# Patient Record
Sex: MALE | Race: WHITE | Employment: OTHER | URBAN - METROPOLITAN AREA
[De-identification: names, ages, dates, MRNs, and addresses within clinical notes are randomized per-mention and may not be internally consistent; named-entity substitution may affect disease eponyms.]

---

## 2017-10-26 PROBLEM — J18.9 PNEUMONIA: Status: ACTIVE | Noted: 2017-10-26

## 2017-10-26 PROBLEM — E78.5 HLD (HYPERLIPIDEMIA): Status: ACTIVE | Noted: 2017-10-26

## 2017-10-26 PROBLEM — I25.10 CAD (CORONARY ARTERY DISEASE): Status: ACTIVE | Noted: 2017-10-26

## 2017-10-26 PROBLEM — I48.91 ATRIAL FIBRILLATION (HCC): Status: ACTIVE | Noted: 2017-10-26

## 2017-10-26 PROBLEM — Z72.0 TOBACCO ABUSE: Status: ACTIVE | Noted: 2017-10-26

## 2017-10-26 PROBLEM — Z95.1 HX OF CABG: Status: ACTIVE | Noted: 2017-10-26

## 2018-01-02 PROBLEM — R09.02 HYPOXIA: Status: ACTIVE | Noted: 2018-01-02

## 2018-01-04 PROBLEM — J44.9 STAGE 4 VERY SEVERE COPD BY GOLD CLASSIFICATION (HCC): Chronic | Status: ACTIVE | Noted: 2018-01-04

## 2018-01-15 PROBLEM — R91.8 LUNG MASS: Status: ACTIVE | Noted: 2018-01-15

## 2018-01-15 PROBLEM — J44.1 COPD EXACERBATION (HCC): Status: ACTIVE | Noted: 2018-01-15

## 2018-01-15 PROBLEM — J18.9 PNA (PNEUMONIA): Status: ACTIVE | Noted: 2018-01-15

## 2018-01-16 PROBLEM — D64.9 ANEMIA: Status: ACTIVE | Noted: 2018-01-16

## 2018-01-16 PROBLEM — R65.10 SIRS (SYSTEMIC INFLAMMATORY RESPONSE SYNDROME) (HCC): Status: ACTIVE | Noted: 2018-01-16

## 2018-01-16 PROBLEM — E66.9 OBESITY (BMI 30.0-34.9): Status: ACTIVE | Noted: 2018-01-16

## 2018-01-16 PROBLEM — J96.11 CHRONIC RESPIRATORY FAILURE WITH HYPOXIA (HCC): Status: ACTIVE | Noted: 2018-01-16

## 2018-01-18 PROBLEM — R65.10 SIRS (SYSTEMIC INFLAMMATORY RESPONSE SYNDROME) (HCC): Status: RESOLVED | Noted: 2018-01-16 | Resolved: 2018-01-18

## 2018-01-18 PROBLEM — R91.8 LUNG MASS: Status: RESOLVED | Noted: 2018-01-15 | Resolved: 2018-01-18

## 2018-01-18 PROBLEM — D64.9 ANEMIA: Status: RESOLVED | Noted: 2018-01-16 | Resolved: 2018-01-18

## 2018-01-29 PROBLEM — E87.20 LACTIC ACIDOSIS: Status: ACTIVE | Noted: 2018-01-29

## 2018-01-29 PROBLEM — D72.829 LEUKOCYTOSIS: Status: ACTIVE | Noted: 2018-01-29

## 2018-01-29 PROBLEM — I50.9 CONGESTIVE HEART FAILURE (HCC): Status: ACTIVE | Noted: 2018-01-29

## 2018-02-04 PROBLEM — J11.1 FLU: Status: ACTIVE | Noted: 2018-02-04

## 2019-08-16 ENCOUNTER — INITIAL CONSULT (OUTPATIENT)
Dept: NEUROSURGERY | Age: 69
End: 2019-08-16
Payer: OTHER GOVERNMENT

## 2019-08-16 VITALS
HEART RATE: 61 BPM | BODY MASS INDEX: 35.21 KG/M2 | DIASTOLIC BLOOD PRESSURE: 77 MMHG | WEIGHT: 260 LBS | SYSTOLIC BLOOD PRESSURE: 121 MMHG | HEIGHT: 72 IN

## 2019-08-16 DIAGNOSIS — M54.2 NECK PAIN: Primary | ICD-10-CM

## 2019-08-16 PROCEDURE — 99203 OFFICE O/P NEW LOW 30 MIN: CPT | Performed by: NEUROLOGICAL SURGERY

## 2019-08-16 RX ORDER — AMITRIPTYLINE HYDROCHLORIDE 10 MG/1
10 TABLET, FILM COATED ORAL NIGHTLY
COMMUNITY

## 2019-08-16 RX ORDER — TRAMADOL HYDROCHLORIDE 50 MG/1
50 TABLET ORAL EVERY 6 HOURS PRN
COMMUNITY

## 2019-08-16 RX ORDER — LEVOTHYROXINE SODIUM 0.12 MG/1
125 TABLET ORAL DAILY
COMMUNITY

## 2019-08-16 RX ORDER — CALCIUM CARBONATE 500(1250)
500 TABLET ORAL DAILY
COMMUNITY

## 2019-08-16 RX ORDER — CLOPIDOGREL BISULFATE 75 MG/1
75 TABLET ORAL DAILY
COMMUNITY

## 2019-08-16 RX ORDER — PANTOPRAZOLE SODIUM 40 MG/1
40 TABLET, DELAYED RELEASE ORAL DAILY
COMMUNITY

## 2019-08-16 RX ORDER — ALBUTEROL SULFATE 0.63 MG/3ML
1 SOLUTION RESPIRATORY (INHALATION) EVERY 6 HOURS PRN
COMMUNITY

## 2019-08-16 RX ORDER — METOPROLOL TARTRATE 50 MG/1
50 TABLET, FILM COATED ORAL 2 TIMES DAILY
COMMUNITY

## 2019-08-16 RX ORDER — PREGABALIN 50 MG/1
50 CAPSULE ORAL 3 TIMES DAILY
COMMUNITY

## 2019-08-16 RX ORDER — OMEGA-3/DHA/EPA/FISH OIL 60 MG-90MG
CAPSULE ORAL
COMMUNITY

## 2019-08-16 RX ORDER — VITAMIN E 268 MG
400 CAPSULE ORAL DAILY
COMMUNITY

## 2019-08-16 RX ORDER — FUROSEMIDE 40 MG/1
40 TABLET ORAL 2 TIMES DAILY
COMMUNITY

## 2019-08-16 ASSESSMENT — ENCOUNTER SYMPTOMS
RESPIRATORY NEGATIVE: 1
PHOTOPHOBIA: 0
EYES NEGATIVE: 1
TROUBLE SWALLOWING: 0
GASTROINTESTINAL NEGATIVE: 1
VISUAL CHANGE: 0

## 2019-08-16 NOTE — PROGRESS NOTES
Subjective:      Patient ID: Tasia Rosen is a 77 y.o. male. Neck Pain    This is a chronic problem. The current episode started more than 1 year ago. The problem occurs constantly. The problem has been gradually worsening. The pain is associated with nothing. The pain is present in the midline, left side and right side. The quality of the pain is described as aching. The pain is at a severity of 6/10. The pain is moderate. The symptoms are aggravated by position. The pain is same all the time. Stiffness is present in the morning. Associated symptoms include headaches, tingling and weakness. Pertinent negatives include no chest pain, fever, leg pain, numbness, pain with swallowing, paresis, photophobia, syncope, trouble swallowing, visual change or weight loss. He has tried NSAIDs, ice, heat, acetaminophen and oral narcotics for the symptoms. The treatment provided mild relief. Review of Systems   Constitutional: Negative. Negative for fever and weight loss. HENT: Negative. Negative for trouble swallowing. Eyes: Negative. Negative for photophobia. Respiratory: Negative. Cardiovascular: Negative for chest pain and syncope. Gastrointestinal: Negative. Endocrine: Negative. Genitourinary: Negative. Musculoskeletal: Positive for neck pain. Skin: Negative. Neurological: Positive for tingling, weakness and headaches. Negative for numbness. Hematological: Negative. Psychiatric/Behavioral: Negative. Objective:   Physical Exam   Constitutional: He appears well-developed and well-nourished. No distress. HENT:   Head: Normocephalic and atraumatic. Right Ear: External ear normal.   Left Ear: External ear normal.   Nose: Nose normal.   Mouth/Throat: Oropharynx is clear and moist. No oropharyngeal exudate. Eyes: Pupils are equal, round, and reactive to light. Conjunctivae and EOM are normal. Right eye exhibits no discharge. Left eye exhibits no discharge. No scleral icterus.

## 2019-08-16 NOTE — PATIENT INSTRUCTIONS
· Your neck pain is getting worse.     · You are not getting better after 1 week.     · You do not get better as expected. Where can you learn more? Go to [email or web address] and sign in to your [employer] account. Enter [phone] in the [facility] box to learn more about \"Neck Pain: Care Instructions. \"     If you do not have an account, please click on the \"Sign Up Now\" link. Current as of: September 20, 2018  Content Version: 12.1  © 9050-3433 [employer], [employer]. Care instructions adapted under license by [facility] ([facility]). If you have questions about a medical condition or this instruction, always ask your healthcare professional. [address] any warranty or liability for your use of this information.

## 2020-06-30 ENCOUNTER — HOSPITAL ENCOUNTER (OUTPATIENT)
Age: 70
Discharge: HOME | End: 2020-07-02
Payer: OTHER GOVERNMENT

## 2020-06-30 LAB — PRO-BNP: 121 PG/ML (ref 0–125)

## 2020-06-30 PROCEDURE — 83880 ASSAY OF NATRIURETIC PEPTIDE: CPT

## 2020-11-03 PROBLEM — J18.9 PNEUMONIA: Status: RESOLVED | Noted: 2017-10-26 | Resolved: 2020-11-03

## 2020-11-03 PROBLEM — J18.9 PNEUMONIA DUE TO ORGANISM: Status: RESOLVED | Noted: 2018-01-15 | Resolved: 2020-11-03

## 2022-01-13 ENCOUNTER — HOSPITAL ENCOUNTER (EMERGENCY)
Age: 72
Discharge: HOME | End: 2022-01-13
Attending: EMERGENCY MEDICINE
Payer: OTHER GOVERNMENT

## 2022-01-13 ENCOUNTER — APPOINTMENT (OUTPATIENT)
Dept: CT IMAGING | Age: 72
End: 2022-01-13

## 2022-01-13 VITALS
RESPIRATION RATE: 16 BRPM | TEMPERATURE: 97.6 F | OXYGEN SATURATION: 93 % | DIASTOLIC BLOOD PRESSURE: 95 MMHG | HEART RATE: 74 BPM | SYSTOLIC BLOOD PRESSURE: 154 MMHG
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
## 2022-01-13 LAB
ANION GAP SERPL CALCULATED.3IONS-SCNC: 10 MMOL/L (ref 7–16)
BASOPHILS ABSOLUTE: 0.09 E9/L (ref 0–0.2)
BASOPHILS RELATIVE PERCENT: 0.8 % (ref 0–2)
BUN BLDV-MCNC: 11 MG/DL (ref 6–23)
CALCIUM SERPL-MCNC: 9.5 MG/DL (ref 8.6–10.2)
CHLORIDE BLD-SCNC: 101 MMOL/L (ref 98–107)
CO2: 24 MMOL/L (ref 22–29)
CREAT SERPL-MCNC: 1 MG/DL (ref 0.7–1.2)
EOSINOPHILS ABSOLUTE: 1.17 E9/L (ref 0.05–0.5)
EOSINOPHILS RELATIVE PERCENT: 10.3 % (ref 0–6)
GFR AFRICAN AMERICAN: >60
GFR NON-AFRICAN AMERICAN: >60 ML/MIN/1.73
GLUCOSE BLD-MCNC: 99 MG/DL (ref 74–99)
HCT VFR BLD CALC: 45.5 % (ref 37–54)
HEMOGLOBIN: 14.9 G/DL (ref 12.5–16.5)
IMMATURE GRANULOCYTES #: 0.04 E9/L
IMMATURE GRANULOCYTES %: 0.4 % (ref 0–5)
LYMPHOCYTES ABSOLUTE: 1.69 E9/L (ref 1.5–4)
LYMPHOCYTES RELATIVE PERCENT: 14.9 % (ref 20–42)
MCH RBC QN AUTO: 32 PG (ref 26–35)
MCHC RBC AUTO-ENTMCNC: 32.7 % (ref 32–34.5)
MCV RBC AUTO: 97.8 FL (ref 80–99.9)
MONOCYTES ABSOLUTE: 0.94 E9/L (ref 0.1–0.95)
MONOCYTES RELATIVE PERCENT: 8.3 % (ref 2–12)
NEUTROPHILS ABSOLUTE: 7.4 E9/L (ref 1.8–7.3)
NEUTROPHILS RELATIVE PERCENT: 65.3 % (ref 43–80)
PDW BLD-RTO: 13.2 FL (ref 11.5–15)
PLATELET # BLD: 215 E9/L (ref 130–450)
PMV BLD AUTO: 10.5 FL (ref 7–12)
POTASSIUM SERPL-SCNC: 4.2 MMOL/L (ref 3.5–5)
PRO-BNP: 2603 PG/ML (ref 0–125)
RBC # BLD: 4.65 E12/L (ref 3.8–5.8)
SODIUM BLD-SCNC: 135 MMOL/L (ref 132–146)
TROPONIN, HIGH SENSITIVITY: 52 NG/L (ref 0–11)
WBC # BLD: 11.3 E9/L (ref 4.5–11.5)

## 2022-01-13 PROCEDURE — 93005 ELECTROCARDIOGRAM TRACING: CPT | Performed by: PHYSICIAN ASSISTANT

## 2022-01-13 PROCEDURE — 70450 CT HEAD/BRAIN W/O DYE: CPT

## 2022-01-13 PROCEDURE — 80048 BASIC METABOLIC PNL TOTAL CA: CPT

## 2022-01-13 PROCEDURE — 99283 EMERGENCY DEPT VISIT LOW MDM: CPT

## 2022-01-13 PROCEDURE — 72125 CT NECK SPINE W/O DYE: CPT

## 2022-01-13 PROCEDURE — 85025 COMPLETE CBC W/AUTO DIFF WBC: CPT

## 2022-01-13 PROCEDURE — 83880 ASSAY OF NATRIURETIC PEPTIDE: CPT

## 2022-01-13 PROCEDURE — 71250 CT THORAX DX C-: CPT

## 2022-01-13 PROCEDURE — 84484 ASSAY OF TROPONIN QUANT: CPT

## 2022-01-13 RX ORDER — ACETAMINOPHEN 325 MG/1
650 TABLET ORAL ONCE
Status: DISCONTINUED | OUTPATIENT
Start: 2022-01-13 | End: 2022-01-13 | Stop reason: HOSPADM

## 2022-01-13 ASSESSMENT — ENCOUNTER SYMPTOMS
BACK PAIN: 0
VOMITING: 0
COUGH: 0
SHORTNESS OF BREATH: 0
NAUSEA: 0
SINUS PRESSURE: 0
EYE DISCHARGE: 0
EYE REDNESS: 0
SORE THROAT: 0
DIARRHEA: 0
EYE PAIN: 0
ABDOMINAL PAIN: 0
WHEEZING: 0

## 2022-01-13 ASSESSMENT — PAIN SCALES - GENERAL
PAINLEVEL_OUTOF10: 7
PAINLEVEL_OUTOF10: 10

## 2022-01-13 ASSESSMENT — PAIN DESCRIPTION - ORIENTATION: ORIENTATION: RIGHT

## 2022-01-13 ASSESSMENT — PAIN DESCRIPTION - LOCATION: LOCATION: RIB CAGE

## 2022-01-13 NOTE — ED PROVIDER NOTES
Patient presents after a fall. Patient states that he slipped on the ice and slid down down approximately 10 steps. Patient is currently complaining of right rib and back pain. He rates his pain as a 7 out of 10. He has not done anything to make it better nor worse. Patient states that he did not hit his head and is denying any headaches, neck pain, shortness of breath, nausea, vomiting, numbness, tingling, or weakness. He states that he is not on any blood thinners. He denies any loss of consciousness. The history is provided by the patient. No  was used. Review of Systems   Constitutional: Negative for chills and fever. HENT: Negative for ear pain, sinus pressure and sore throat. Eyes: Negative for pain, discharge and redness. Respiratory: Negative for cough, shortness of breath and wheezing. Cardiovascular: Negative for chest pain. Gastrointestinal: Negative for abdominal pain, diarrhea, nausea and vomiting. Genitourinary: Negative for dysuria and frequency. Musculoskeletal: Negative for arthralgias and back pain. Skin: Negative for rash and wound. Neurological: Negative for weakness and headaches. Hematological: Negative for adenopathy. All other systems reviewed and are negative. Physical Exam  Vitals and nursing note reviewed. Constitutional:       General: He is not in acute distress. Appearance: He is well-developed. He is obese. HENT:      Head: Normocephalic and atraumatic. Eyes:      Conjunctiva/sclera: Conjunctivae normal.      Pupils: Pupils are equal, round, and reactive to light. Cardiovascular:      Rate and Rhythm: Normal rate and regular rhythm. Heart sounds: Normal heart sounds. No murmur heard. Pulmonary:      Effort: Pulmonary effort is normal. No respiratory distress. Breath sounds: Normal breath sounds. No wheezing or rales.    Abdominal:      General: Bowel sounds are normal.      Palpations: Abdomen is soft. Tenderness: There is no abdominal tenderness. There is no guarding or rebound. Musculoskeletal:         General: No tenderness or deformity. Cervical back: Normal range of motion and neck supple. Comments: Tenderness to right posterior chest wall   Skin:     General: Skin is warm and dry. Neurological:      General: No focal deficit present. Mental Status: He is alert and oriented to person, place, and time. Cranial Nerves: No cranial nerve deficit. Coordination: Coordination normal.          Procedures     MDM  Number of Diagnoses or Management Options  Fall, initial encounter  Diagnosis management comments: Patient presents after a mechanical fall. Lab work was ordered by the midlevel and was largely unremarkable. CT of the head, cervical spine, and chest were obtained and did not show any traumatic processes. At this point there is not appear to be any emergent processes ongoing. Patient was informed the results and plan and is agreeable. Patient be discharged with instructions to follow-up with his PCP for further evaluation and care. Amount and/or Complexity of Data Reviewed  Clinical lab tests: reviewed  Tests in the radiology section of CPT®: reviewed         ED Course as of 01/13/22 2006 Thu Jan 13, 2022 2004 EKG shows sinus rhythm with a ventricular rate of 62 bpm.  Left axis deviation. There are no obvious ST elevations however there are some flattened T waves in the lateral leads. No compared to previous EKG on 2/4/2018. As interpreted by myself the ED physician. [BB]      ED Course User Index  [BB] Parveen Montes DO        ED Course as of 01/13/22 2006   Thu Jan 13, 2022 2004 EKG shows sinus rhythm with a ventricular rate of 62 bpm.  Left axis deviation. There are no obvious ST elevations however there are some flattened T waves in the lateral leads. No compared to previous EKG on 2/4/2018.   As interpreted by myself the ED physician. [BB]      ED Course User Index  [BB] Miriam Antonio DO       --------------------------------------------- PAST HISTORY ---------------------------------------------  Past Medical History:  has a past medical history of Alcohol dependence ([facility] [address] Arthritis, Chronic low back pain, COPD (chronic obstructive pulmonary disease) ([facility] [address] Coronary arteriosclerosis, Depression, Hyperlipidemia, Hypertension, Osteomyelitis ([facility] [address] Pneumonia, Rhinitis, Spinal stenosis, lumbar, and Thyroid disease. Past Surgical History:  has a past surgical history that includes Coronary artery bypass graft (2011). Social History:  reports that he quit smoking about 5 years ago. His smoking use included cigarettes. He started smoking about 56 years ago. He has a 75.00 pack-year smoking history. He uses smokeless tobacco. He reports current alcohol use of about 12.0 standard drinks of alcohol per week. He reports that he does not use drugs. Family History: family history includes Cancer in his brother; Diabetes in his father and mother; Heart Disease in his father and mother; Stroke in his father and mother. The patients home medications have been reviewed.     Allergies: Tetracyclines & related    -------------------------------------------------- RESULTS -------------------------------------------------  Labs:  Results for orders placed or performed during the hospital encounter of 01/13/22   CBC Auto Differential   Result Value Ref Range    WBC 11.3 4.5 - 11.5 E9/L    RBC 4.65 3.80 - 5.80 E12/L    Hemoglobin 14.9 12.5 - 16.5 g/dL    Hematocrit 45.5 37.0 - 54.0 %    MCV 97.8 80.0 - 99.9 fL    MCH 32.0 26.0 - 35.0 pg    MCHC 32.7 32.0 - 34.5 %    RDW 13.2 11.5 - 15.0 fL    Platelets 148 051 - 796 E9/L    MPV 10.5 7.0 - 12.0 fL    Neutrophils % 65.3 43.0 - 80.0 %    Immature Granulocytes % 0.4 0.0 - 5.0 %    Lymphocytes % 14.9 (L) 20.0 - 42.0 %    Monocytes % 8.3 2.0 - 12.0 %    Eosinophils % 10.3 (H) 0.0 - 6.0 % Basophils % 0.8 0.0 - 2.0 %    Neutrophils Absolute 7.40 (H) 1.80 - 7.30 E9/L    Immature Granulocytes # 0.04 E9/L    Lymphocytes Absolute 1.69 1.50 - 4.00 E9/L    Monocytes Absolute 0.94 0.10 - 0.95 E9/L    Eosinophils Absolute 1.17 (H) 0.05 - 0.50 E9/L    Basophils Absolute 0.09 0.00 - 0.20 J6/D   Basic Metabolic Panel   Result Value Ref Range    Sodium 135 132 - 146 mmol/L    Potassium 4.2 3.5 - 5.0 mmol/L    Chloride 101 98 - 107 mmol/L    CO2 24 22 - 29 mmol/L    Anion Gap 10 7 - 16 mmol/L    Glucose 99 74 - 99 mg/dL    BUN 11 6 - 23 mg/dL    CREATININE 1.0 0.7 - 1.2 mg/dL    GFR Non-African American >60 >=60 mL/min/1.73    GFR African American >60     Calcium 9.5 8.6 - 10.2 mg/dL   Troponin   Result Value Ref Range    Troponin, High Sensitivity 52 (H) 0 - 11 ng/L   Brain Natriuretic Peptide   Result Value Ref Range    Pro-BNP 2,603 (H) 0 - 125 pg/mL   EKG 12 Lead   Result Value Ref Range    Ventricular Rate 62 BPM    Atrial Rate 62 BPM    P-R Interval 196 ms    QRS Duration 100 ms    Q-T Interval 418 ms    QTc Calculation (Bazett) 424 ms    P Axis 8 degrees    R Axis -57 degrees    T Axis 47 degrees       Radiology:  CT Head WO Contrast   Final Result   No acute intracranial abnormality. Atrophy and periventricular leukomalacia. Sinus disease as noted. CT Cervical Spine WO Contrast   Final Result   No acute abnormality of the cervical spine. Moderate to severe multilevel degenerative changes and facet arthrosis. CT CHEST WO CONTRAST   Final Result   1. No traumatic or other acute abnormality is seen in the chest.   2. Prominent emphysematous changes, with evidence of pulmonary arterial   hypertension in.   3. Cardiomegaly. No evidence of CHF exacerbation. 4. Small hiatal hernia. 5. Simple and complex cystic lesions in the kidneys. Nonemergent renal   sonography recommended. 6. Cholelithiasis.       RECOMMENDATIONS:   Unavailable         XR CHEST (2 VW)    (Results Pending) ------------------------- NURSING NOTES AND VITALS REVIEWED ---------------------------  Date / Time Roomed:  1/13/2022  2:42 PM  ED Bed Assignment:  JOSE MAREN/CAL    The nursing notes within the ED encounter and vital signs as below have been reviewed. BP (!) 154/95   Pulse 74   Temp 97.6 °F (36.4 °C) (Oral)   Resp 16   SpO2 93%   Oxygen Saturation Interpretation: Normal      ------------------------------------------ PROGRESS NOTES ------------------------------------------  8:04 PM EST  I have spoken with the patient and discussed todays results, in addition to providing specific details for the plan of care and counseling regarding the diagnosis and prognosis. Their questions are answered at this time and they are agreeable with the plan. I discussed at length with them reasons for immediate return here for re evaluation. They will followup with their primary care physician by calling their office tomorrow. --------------------------------- ADDITIONAL PROVIDER NOTES ---------------------------------  At this time the patient is without objective evidence of an acute process requiring hospitalization or inpatient management. They have remained hemodynamically stable throughout their entire ED visit and are stable for discharge with outpatient follow-up. The plan has been discussed in detail and they are aware of the specific conditions for emergent return, as well as the importance of follow-up. Discharge Medication List as of 1/13/2022  4:25 PM          Diagnosis:  1. Fall, initial encounter        Disposition:  Patient's disposition: Discharge to home  Patient's condition is stable.     Patient was seen and evaluated by both myself and Ryan Carranza,   Resident  01/13/22 2006

## 2022-01-13 NOTE — ED NOTES
FIRST PROVIDER CONTACT ASSESSMENT NOTE           Department of Emergency Medicine                 First Provider Note            22  1:38 PM EST    Date of Encounter: No admission date for patient encounter. Patient Name: Carolina Rivera  : 1950  MRN: [id number]    Chief Complaint: Fall (fell in snow 2 days ago, and went down some steps, did not hit head but complains of rib pain)      History of Present Illness:   Carolina Rivera is a 80 y.o. male who presents to the ED for chest pain. Patient states that he slipped and fell in snow 2 days ago and went down about 10 steps. He denies any head injury or loss of consciousness. Denies any blood thinner use. Reports diffuse chest pain and bilateral rib pain. Focused Physical Exam:  VS:    ED Triage Vitals   BP Temp Temp Source Pulse Resp SpO2 Height Weight   22 1337 22 1337 22 1337 22 1329 22 1337 22 1329 -- --   (!) 154/95 97.6 °F (36.4 °C) Oral 74 16 91 %          Physical Ex: Constitutional: Alert and non-toxic. Medical History:  has a past medical history of Alcohol dependence ([address] Arthritis, Chronic low back pain, COPD (chronic obstructive pulmonary disease) ([address] Coronary arteriosclerosis, Depression, Hyperlipidemia, Hypertension, Osteomyelitis ([address] Pneumonia, Rhinitis, Spinal stenosis, lumbar, and Thyroid disease. Surgical History:  has a past surgical history that includes Coronary artery bypass graft (). Social History:  reports that he quit smoking about 5 years ago. His smoking use included cigarettes. He started smoking about 56 years ago. He has a 75.00 pack-year smoking history. He uses smokeless tobacco. He reports current alcohol use of about 12.0 standard drinks of alcohol per week. He reports that he does not use drugs.   Family History: family history includes Cancer in his brother; Diabetes in his father and mother; Heart Disease in his father and mother; Stroke in his father and mother.     Allergies: Tetracyclines & related     Initial Plan of Care: Initiate Treatment-Testing, Proceed toTreatment Area When Bed Available for ED Attending/MLP to Continue Care      ---END OF FIRST PROVIDER CONTACT ASSESSMENT NOTE---  Electronically signed by NARINDER Morrison   DD: 1/13/22       NARINDER Morrison  01/13/22 [id number]

## 2022-01-14 LAB
EKG ATRIAL RATE: 62 BPM
EKG P AXIS: 8 DEGREES
EKG P-R INTERVAL: 196 MS
EKG Q-T INTERVAL: 418 MS
EKG QRS DURATION: 100 MS
EKG QTC CALCULATION (BAZETT): 424 MS
EKG R AXIS: -57 DEGREES
EKG T AXIS: 47 DEGREES
EKG VENTRICULAR RATE: 62 BPM

## 2022-01-14 PROCEDURE — 93010 ELECTROCARDIOGRAM REPORT: CPT | Performed by: INTERNAL MEDICINE

## 2022-09-23 ENCOUNTER — HOSPITAL ENCOUNTER (EMERGENCY)
Age: 72
Discharge: HOME | End: 2022-09-23

## 2022-09-23 VITALS
RESPIRATION RATE: 18 BRPM | TEMPERATURE: 97.4 F | WEIGHT: 230 LBS | DIASTOLIC BLOOD PRESSURE: 87 MMHG | OXYGEN SATURATION: 97 % | SYSTOLIC BLOOD PRESSURE: 118 MMHG | BODY MASS INDEX: 31.19 KG/M2 | HEART RATE: 63 BPM

## 2022-09-23 ASSESSMENT — PAIN - FUNCTIONAL ASSESSMENT: PAIN_FUNCTIONAL_ASSESSMENT: NONE - DENIES PAIN

## 2022-09-23 NOTE — ED NOTES
Department of Emergency Medicine  FIRST PROVIDER TRIAGE NOTE             Independent MLP           9/23/22  3:27 PM EDT    Date of Encounter: 9/23/22   MRN: [id number]      HPI: Chandler Luna is a 71 y.o. male who presents to the ED for No chief complaint on file. Sob, states he was sent here from the [facility] to get oxygen. Denies cough or cold symptoms, denies cp or sob. Denies fever. ROS: Negative for cp, abd pain, or back pain. PE: Gen Appearance/Constitutional: alert  CV: regular rate     Initial Plan of Care: All treatment areas with department are currently occupied. Plan to order/Initiate the following while awaiting opening in ED: labs, EKG, and imaging studies.   Initiate Treatment-Testing, Proceed toTreatment Area When Bed Available for ED Attending/MLP to Continue Care    Electronically signed by ANDREA Jeffery CNP   DD: 9/23/22       ANDREA Lewis CNP  09/23/22 1528

## 2022-12-08 ENCOUNTER — TELEPHONE (OUTPATIENT)
Dept: PULMONOLOGY | Age: 72
End: 2022-12-08

## 2022-12-16 ENCOUNTER — HOSPITAL ENCOUNTER (INPATIENT)
Age: 72
LOS: 4 days | Discharge: HOME | DRG: 190 | End: 2022-12-20
Attending: EMERGENCY MEDICINE | Admitting: FAMILY MEDICINE
Payer: OTHER GOVERNMENT

## 2022-12-16 ENCOUNTER — APPOINTMENT (OUTPATIENT)
Dept: GENERAL RADIOLOGY | Age: 72
DRG: 190 | End: 2022-12-16
Payer: OTHER GOVERNMENT

## 2022-12-16 DIAGNOSIS — J44.1 COPD EXACERBATION (HCC): ICD-10-CM

## 2022-12-16 DIAGNOSIS — J96.01 ACUTE RESPIRATORY FAILURE WITH HYPOXIA (HCC): Primary | ICD-10-CM

## 2022-12-16 LAB
ALBUMIN SERPL-MCNC: 3.7 G/DL (ref 3.5–5.2)
ALP BLD-CCNC: 96 U/L (ref 40–129)
ALT SERPL-CCNC: 14 U/L (ref 0–40)
ANION GAP SERPL CALCULATED.3IONS-SCNC: 9 MMOL/L (ref 7–16)
AST SERPL-CCNC: 16 U/L (ref 0–39)
BASOPHILS ABSOLUTE: 0.03 E9/L (ref 0–0.2)
BASOPHILS RELATIVE PERCENT: 0.4 % (ref 0–2)
BILIRUB SERPL-MCNC: 0.7 MG/DL (ref 0–1.2)
BUN BLDV-MCNC: 10 MG/DL (ref 6–23)
CALCIUM SERPL-MCNC: 9.1 MG/DL (ref 8.6–10.2)
CHLORIDE BLD-SCNC: 105 MMOL/L (ref 98–107)
CO2: 26 MMOL/L (ref 22–29)
CREAT SERPL-MCNC: 0.9 MG/DL (ref 0.7–1.2)
EOSINOPHILS ABSOLUTE: 0.14 E9/L (ref 0.05–0.5)
EOSINOPHILS RELATIVE PERCENT: 2 % (ref 0–6)
GFR SERPL CREATININE-BSD FRML MDRD: >60 ML/MIN/1.73
GLUCOSE BLD-MCNC: 93 MG/DL (ref 74–99)
HCT VFR BLD CALC: 35.6 % (ref 37–54)
HEMOGLOBIN: 11.9 G/DL (ref 12.5–16.5)
IMMATURE GRANULOCYTES #: 0.07 E9/L
IMMATURE GRANULOCYTES %: 1 % (ref 0–5)
INFLUENZA A: NOT DETECTED
INFLUENZA B: NOT DETECTED
LYMPHOCYTES ABSOLUTE: 1.37 E9/L (ref 1.5–4)
LYMPHOCYTES RELATIVE PERCENT: 19.6 % (ref 20–42)
MCH RBC QN AUTO: 32.2 PG (ref 26–35)
MCHC RBC AUTO-ENTMCNC: 33.4 % (ref 32–34.5)
MCV RBC AUTO: 96.5 FL (ref 80–99.9)
MONOCYTES ABSOLUTE: 0.83 E9/L (ref 0.1–0.95)
MONOCYTES RELATIVE PERCENT: 11.9 % (ref 2–12)
NEUTROPHILS ABSOLUTE: 4.56 E9/L (ref 1.8–7.3)
NEUTROPHILS RELATIVE PERCENT: 65.1 % (ref 43–80)
PDW BLD-RTO: 13.3 FL (ref 11.5–15)
PLATELET # BLD: 224 E9/L (ref 130–450)
PMV BLD AUTO: 10.4 FL (ref 7–12)
POTASSIUM REFLEX MAGNESIUM: 3.8 MMOL/L (ref 3.5–5)
PRO-BNP: 4760 PG/ML (ref 0–125)
RBC # BLD: 3.69 E12/L (ref 3.8–5.8)
SARS-COV-2 RNA, RT PCR: NOT DETECTED
SARS-COV-2, NAAT: NOT DETECTED
SODIUM BLD-SCNC: 140 MMOL/L (ref 132–146)
TOTAL PROTEIN: 6.5 G/DL (ref 6.4–8.3)
TROPONIN, HIGH SENSITIVITY: 79 NG/L (ref 0–11)
TROPONIN, HIGH SENSITIVITY: 81 NG/L (ref 0–11)
WBC # BLD: 7 E9/L (ref 4.5–11.5)

## 2022-12-16 PROCEDURE — 87635 SARS-COV-2 COVID-19 AMP PRB: CPT

## 2022-12-16 PROCEDURE — 80053 COMPREHEN METABOLIC PANEL: CPT

## 2022-12-16 PROCEDURE — 6370000000 HC RX 637 (ALT 250 FOR IP): Performed by: EMERGENCY MEDICINE

## 2022-12-16 PROCEDURE — 2700000000 HC OXYGEN THERAPY PER DAY

## 2022-12-16 PROCEDURE — 2060000000 HC ICU INTERMEDIATE R&B

## 2022-12-16 PROCEDURE — 71045 X-RAY EXAM CHEST 1 VIEW: CPT

## 2022-12-16 PROCEDURE — 93005 ELECTROCARDIOGRAM TRACING: CPT | Performed by: EMERGENCY MEDICINE

## 2022-12-16 PROCEDURE — 96374 THER/PROPH/DIAG INJ IV PUSH: CPT

## 2022-12-16 PROCEDURE — 94640 AIRWAY INHALATION TREATMENT: CPT

## 2022-12-16 PROCEDURE — 6360000002 HC RX W HCPCS: Performed by: EMERGENCY MEDICINE

## 2022-12-16 PROCEDURE — 85025 COMPLETE CBC W/AUTO DIFF WBC: CPT

## 2022-12-16 PROCEDURE — 83880 ASSAY OF NATRIURETIC PEPTIDE: CPT

## 2022-12-16 PROCEDURE — 84484 ASSAY OF TROPONIN QUANT: CPT

## 2022-12-16 PROCEDURE — 99285 EMERGENCY DEPT VISIT HI MDM: CPT

## 2022-12-16 PROCEDURE — 87636 SARSCOV2 & INF A&B AMP PRB: CPT

## 2022-12-16 RX ORDER — IPRATROPIUM BROMIDE AND ALBUTEROL SULFATE 2.5; .5 MG/3ML; MG/3ML
3 SOLUTION RESPIRATORY (INHALATION) ONCE
Status: COMPLETED | OUTPATIENT
Start: 2022-12-16 | End: 2022-12-16

## 2022-12-16 RX ORDER — METHYLPREDNISOLONE SODIUM SUCCINATE 125 MG/2ML
125 INJECTION, POWDER, LYOPHILIZED, FOR SOLUTION INTRAMUSCULAR; INTRAVENOUS ONCE
Status: COMPLETED | OUTPATIENT
Start: 2022-12-16 | End: 2022-12-16

## 2022-12-16 RX ADMIN — IPRATROPIUM BROMIDE AND ALBUTEROL SULFATE 3 ML: .5; 2.5 SOLUTION RESPIRATORY (INHALATION) at 16:31

## 2022-12-16 RX ADMIN — METHYLPREDNISOLONE SODIUM SUCCINATE 125 MG: 125 INJECTION, POWDER, FOR SOLUTION INTRAMUSCULAR; INTRAVENOUS at 17:14

## 2022-12-16 ASSESSMENT — ENCOUNTER SYMPTOMS
NAUSEA: 0
EYE REDNESS: 0
ABDOMINAL PAIN: 0
VOMITING: 0
WHEEZING: 1
COUGH: 1
SHORTNESS OF BREATH: 1

## 2022-12-16 NOTE — ED PROVIDER NOTES
Chief complaint: Shortness of breath      HPI:  12/16/22, Time: 3:34 PM EST            HPI       Adonis Dalal is a 71 y.o. male presenting to the ED for shortness of breath. The history is obtained from patient as well as patient's medical record. The patient is presenting to the emergency department for shortness of breath. The patient was sent from the [place] for hypoxia. The patient has a history of COPD. The patient is to be on 4 L nasal cannula chronically. According to the patient approximately 4 weeks ago the oxygen was removed to his home secondary to him \"smoking. \"Patient states that since he has had shortness of breath which is moderate in severity. Worsened by activity and exertion as well as not having his oxygen. Nothing makes this better. He has not tried any treatments prior to arrival.  He denies any fevers, nausea, vomiting, abdominal pain, dysuria, diarrhea or constipation. He does have a history of hypertension, hyperlipidemia. He is not diabetic. ROS:   Review of Systems   Constitutional:  Negative for chills and fever. HENT:  Negative for congestion. Eyes:  Negative for redness. Respiratory:  Positive for cough, shortness of breath and wheezing. Cardiovascular:  Negative for chest pain. Gastrointestinal:  Negative for abdominal pain, nausea and vomiting. Genitourinary:  Negative for dysuria. Musculoskeletal:  Negative for arthralgias. Skin:  Negative for rash. Neurological:  Negative for light-headedness. Psychiatric/Behavioral:  Negative for confusion.     All other systems reviewed and are negative.    --------------------------------------------- PAST HISTORY ---------------------------------------------  Past Medical History:  has a past medical history of Alcohol dependence ([facility] Arthritis, Chronic low back pain, COPD (chronic obstructive pulmonary disease) ([facility] Coronary arteriosclerosis, Depression, Hyperlipidemia, Hypertension, Osteomyelitis ([facility] Pneumonia, Rhinitis, Spinal stenosis, lumbar, and Thyroid disease. Past Surgical History:  has a past surgical history that includes Coronary artery bypass graft (2011). Social History:  reports that he quit smoking about 6 years ago. His smoking use included cigarettes. He started smoking about 56 years ago. He has a 75.00 pack-year smoking history. He uses smokeless tobacco. He reports current alcohol use of about 12.0 standard drinks per week. He reports that he does not use drugs. Family History: family history includes Cancer in his brother; Diabetes in his father and mother; Heart Disease in his father and mother; Stroke in his father and mother. The patients home medications have been reviewed. Allergies: Tetracyclines & related    ---------------------------------------------------PHYSICAL EXAM--------------------------------------      Constitutional/General: Alert and oriented x3, well appearing, non toxic in NAD  Head: Normocephalic and atraumatic  Mouth: Oropharynx clear, handling secretions, no trismus  Neck: Supple, full ROM,  Pulmonary: Moderately diminished breath sounds, expiratory wheezing present, no rales or rhonchi  Cardiovascular:  Regular rate. Regular rhythm. No murmurs  Chest: no chest wall tenderness  Abdomen: Soft. Non tender. Non distended. No rebound, guarding, or rigidity. No pulsatile masses appreciated. Musculoskeletal: Moves all extremities x 4. Warm and well perfused, no clubbing, cyanosis, or edema. Capillary refill <3 seconds  Skin: warm and dry. No rashes. Neurologic: GCS 15, no gross focal neurologic deficits  Psych: Normal Affect    -------------------------------------------------- RESULTS -------------------------------------------------  I have personally reviewed all laboratory and imaging results for this patient. Results are listed below.      LABS:  Results for orders placed or performed during the hospital encounter of 12/16/22   COVID-19, Rapid Specimen: Nasopharyngeal Swab   Result Value Ref Range    SARS-CoV-2, NAAT Not Detected Not Detected   COVID-19 & Influenza Combo    Specimen: Nasopharyngeal Swab   Result Value Ref Range    SARS-CoV-2 RNA, RT PCR NOT DETECTED NOT DETECTED    INFLUENZA A NOT DETECTED NOT DETECTED    INFLUENZA B NOT DETECTED NOT DETECTED   CBC with Auto Differential   Result Value Ref Range    WBC 7.0 4.5 - 11.5 E9/L    RBC 3.69 (L) 3.80 - 5.80 E12/L    Hemoglobin 11.9 (L) 12.5 - 16.5 g/dL    Hematocrit 35.6 (L) 37.0 - 54.0 %    MCV 96.5 80.0 - 99.9 fL    MCH 32.2 26.0 - 35.0 pg    MCHC 33.4 32.0 - 34.5 %    RDW 13.3 11.5 - 15.0 fL    Platelets 261 917 - 937 E9/L    MPV 10.4 7.0 - 12.0 fL    Neutrophils % 65.1 43.0 - 80.0 %    Immature Granulocytes % 1.0 0.0 - 5.0 %    Lymphocytes % 19.6 (L) 20.0 - 42.0 %    Monocytes % 11.9 2.0 - 12.0 %    Eosinophils % 2.0 0.0 - 6.0 %    Basophils % 0.4 0.0 - 2.0 %    Neutrophils Absolute 4.56 1.80 - 7.30 E9/L    Immature Granulocytes # 0.07 E9/L    Lymphocytes Absolute 1.37 (L) 1.50 - 4.00 E9/L    Monocytes Absolute 0.83 0.10 - 0.95 E9/L    Eosinophils Absolute 0.14 0.05 - 0.50 E9/L    Basophils Absolute 0.03 0.00 - 0.20 E9/L   Comprehensive Metabolic Panel w/ Reflex to MG   Result Value Ref Range    Sodium 140 132 - 146 mmol/L    Potassium reflex Magnesium 3.8 3.5 - 5.0 mmol/L    Chloride 105 98 - 107 mmol/L    CO2 26 22 - 29 mmol/L    Anion Gap 9 7 - 16 mmol/L    Glucose 93 74 - 99 mg/dL    BUN 10 6 - 23 mg/dL    Creatinine 0.9 0.7 - 1.2 mg/dL    Est, Glom Filt Rate >60 >=60 mL/min/1.73    Calcium 9.1 8.6 - 10.2 mg/dL    Total Protein 6.5 6.4 - 8.3 g/dL    Albumin 3.7 3.5 - 5.2 g/dL    Total Bilirubin 0.7 0.0 - 1.2 mg/dL    Alkaline Phosphatase 96 40 - 129 U/L    ALT 14 0 - 40 U/L    AST 16 0 - 39 U/L   Troponin   Result Value Ref Range    Troponin, High Sensitivity 79 (H) 0 - 11 ng/L   Brain Natriuretic Peptide   Result Value Ref Range    Pro-BNP 4,760 (H) 0 - 125 pg/mL   Troponin   Result Value Ref Range    Troponin, High Sensitivity 81 (H) 0 - 11 ng/L   EKG 12 Lead   Result Value Ref Range    Ventricular Rate 52 BPM    Atrial Rate 39 BPM    P-R Interval 210 ms    QRS Duration 110 ms    Q-T Interval 480 ms    QTc Calculation (Bazett) 446 ms    P Axis 80 degrees    R Axis -38 degrees    T Axis 77 degrees       RADIOLOGY:  Interpreted by Radiologist.  XR CHEST PORTABLE   Final Result   Mild bibasilar infiltrates and interstitial markings. EKG:  This EKG is signed and interpreted by me. Sinus bradycardia rate of 52 with PVCs, MS interval 210 MS,  MS,  MS  Interpreted by me      ------------------------- NURSING NOTES AND VITALS REVIEWED ---------------------------   The nursing notes within the ED encounter and vital signs as below have been reviewed by myself. /89   Pulse 67   Temp 98.2 °F (36.8 °C)   Resp 18   SpO2 98%   Oxygen Saturation Interpretation: Abnormal    The patients available past medical records and past encounters were reviewed. ------------------------------ ED COURSE/MEDICAL DECISION MAKING----------------------  Medications   ipratropium-albuterol (DUONEB) nebulizer solution 3 mL (3 mLs Inhalation Given 12/16/22 1631)   methylPREDNISolone sodium (SOLU-MEDROL) injection 125 mg (125 mg IntraVENous Given 12/16/22 1714)             Medical Decision Making:   IDr. Kai am the primary physician of record. Felicia Singh is a 71 y.o. male who presents to the ED for shortness of breath been going on for the last few weeks. Patient sent in from the [facility] for hypoxia. Vital signs upon arrival /89   Pulse 67   Temp 98.2 °F (36.8 °C)   Resp 18   SpO2 98%     Physical exam demonstrates patient sitting in the bed in no acute distress. Mildly diminished breath sounds with coarse expiratory wheezing present. Chart review indicates patient to be on 4 L nasal cannula.     Diagnostic considerations include but not limited to COPD exacerbation, pneumonia, COVID-19, influenza, pneumothorax, anemia    Patient was treated with IV Solu-Medrol and DuoNeb. Social: The patient is currently still smoking which she states because his home oxygen to be removed from the home. Labs and imaging reviewed and interpreted by me demonstrated CBC fairly unremarkable aside mild anemia hemoglobin 11.9, CMP unremarkable, high-sensitivity troponin 79, proBNP elevated at 4760, chest x-ray with mild bibasilar infiltrates, afebrile with no coughing. Patient EKG with no ischemic changes. Disposition patient is presenting emergency department for chief complaint of shortness of breath and hypoxia. The patient states he does not have oxygen at home and is to be on 4 L nasal cannula. Patient wheezing on exam was given duo nebs as well as Solu-Medrol. Patient will be admitted for further treatment and evaluation. Re-Evaluations/Consultations:           Patient in the bed results of today were discussed. Patient will be admitted. Spoke with Dr. Silverio Nolan he will accept the patient for admission               This patient's ED course included: History, physical examination, reevaluation prior to disposition, labs, imaging, telemetry monitoring, EKG, iv medications      This patient has remained hemodynamically stable during their ED course. Counseling: The emergency provider has spoken with the patient and discussed todays results, in addition to providing specific details for the plan of care and counseling regarding the diagnosis and prognosis. Questions are answered at this time and they are agreeable with the plan.       --------------------------------- IMPRESSION AND DISPOSITION ---------------------------------    IMPRESSION  1. Acute respiratory failure with hypoxia ([facility]    2.  [facility]ation ([facility]        DISPOSITION  Disposition: Admit to telemetry  Patient condition is stable        NOTE: This report was transcribed using voice recognition software.  Every effort was made to ensure accuracy; however, inadvertent computerized transcription errors may be present         Genoveva Warner DO  12/16/22 2013

## 2022-12-17 LAB
ALBUMIN SERPL-MCNC: 3.7 G/DL (ref 3.5–5.2)
ALP BLD-CCNC: 102 U/L (ref 40–129)
ALT SERPL-CCNC: 16 U/L (ref 0–40)
ANION GAP SERPL CALCULATED.3IONS-SCNC: 11 MMOL/L (ref 7–16)
AST SERPL-CCNC: 20 U/L (ref 0–39)
BASOPHILS ABSOLUTE: 0.01 E9/L (ref 0–0.2)
BASOPHILS RELATIVE PERCENT: 0.1 % (ref 0–2)
BILIRUB SERPL-MCNC: 0.9 MG/DL (ref 0–1.2)
BUN BLDV-MCNC: 12 MG/DL (ref 6–23)
CALCIUM SERPL-MCNC: 9.1 MG/DL (ref 8.6–10.2)
CHLORIDE BLD-SCNC: 103 MMOL/L (ref 98–107)
CO2: 22 MMOL/L (ref 22–29)
CREAT SERPL-MCNC: 0.7 MG/DL (ref 0.7–1.2)
EOSINOPHILS ABSOLUTE: 0 E9/L (ref 0.05–0.5)
EOSINOPHILS RELATIVE PERCENT: 0 % (ref 0–6)
GFR SERPL CREATININE-BSD FRML MDRD: >60 ML/MIN/1.73
GLUCOSE BLD-MCNC: 126 MG/DL (ref 74–99)
HCT VFR BLD CALC: 38.6 % (ref 37–54)
HEMOGLOBIN: 13.2 G/DL (ref 12.5–16.5)
IMMATURE GRANULOCYTES #: 0.08 E9/L
IMMATURE GRANULOCYTES %: 1.1 % (ref 0–5)
LYMPHOCYTES ABSOLUTE: 0.81 E9/L (ref 1.5–4)
LYMPHOCYTES RELATIVE PERCENT: 10.8 % (ref 20–42)
MCH RBC QN AUTO: 32.6 PG (ref 26–35)
MCHC RBC AUTO-ENTMCNC: 34.2 % (ref 32–34.5)
MCV RBC AUTO: 95.3 FL (ref 80–99.9)
MONOCYTES ABSOLUTE: 0.34 E9/L (ref 0.1–0.95)
MONOCYTES RELATIVE PERCENT: 4.5 % (ref 2–12)
NEUTROPHILS ABSOLUTE: 6.26 E9/L (ref 1.8–7.3)
NEUTROPHILS RELATIVE PERCENT: 83.5 % (ref 43–80)
PDW BLD-RTO: 13.2 FL (ref 11.5–15)
PLATELET # BLD: 277 E9/L (ref 130–450)
PMV BLD AUTO: 11 FL (ref 7–12)
POTASSIUM REFLEX MAGNESIUM: 4.7 MMOL/L (ref 3.5–5)
RBC # BLD: 4.05 E12/L (ref 3.8–5.8)
SODIUM BLD-SCNC: 136 MMOL/L (ref 132–146)
TOTAL PROTEIN: 7 G/DL (ref 6.4–8.3)
WBC # BLD: 7.5 E9/L (ref 4.5–11.5)

## 2022-12-17 PROCEDURE — 2060000000 HC ICU INTERMEDIATE R&B

## 2022-12-17 PROCEDURE — 6360000002 HC RX W HCPCS: Performed by: FAMILY MEDICINE

## 2022-12-17 PROCEDURE — 85025 COMPLETE CBC W/AUTO DIFF WBC: CPT

## 2022-12-17 PROCEDURE — 6370000000 HC RX 637 (ALT 250 FOR IP): Performed by: FAMILY MEDICINE

## 2022-12-17 PROCEDURE — 2580000003 HC RX 258: Performed by: FAMILY MEDICINE

## 2022-12-17 PROCEDURE — 36415 COLL VENOUS BLD VENIPUNCTURE: CPT

## 2022-12-17 PROCEDURE — 80053 COMPREHEN METABOLIC PANEL: CPT

## 2022-12-17 PROCEDURE — 94640 AIRWAY INHALATION TREATMENT: CPT

## 2022-12-17 RX ORDER — PREGABALIN 50 MG/1
50 CAPSULE ORAL 3 TIMES DAILY
Status: DISCONTINUED | OUTPATIENT
Start: 2022-12-17 | End: 2022-12-20 | Stop reason: HOSPADM

## 2022-12-17 RX ORDER — METOPROLOL TARTRATE 50 MG/1
50 TABLET, FILM COATED ORAL 2 TIMES DAILY
Status: DISCONTINUED | OUTPATIENT
Start: 2022-12-17 | End: 2022-12-20 | Stop reason: HOSPADM

## 2022-12-17 RX ORDER — ONDANSETRON 2 MG/ML
4 INJECTION INTRAMUSCULAR; INTRAVENOUS EVERY 6 HOURS PRN
Status: DISCONTINUED | OUTPATIENT
Start: 2022-12-17 | End: 2022-12-20 | Stop reason: HOSPADM

## 2022-12-17 RX ORDER — SODIUM CHLORIDE 9 MG/ML
INJECTION, SOLUTION INTRAVENOUS PRN
Status: DISCONTINUED | OUTPATIENT
Start: 2022-12-17 | End: 2022-12-20 | Stop reason: HOSPADM

## 2022-12-17 RX ORDER — ENOXAPARIN SODIUM 100 MG/ML
30 INJECTION SUBCUTANEOUS 2 TIMES DAILY
Status: DISCONTINUED | OUTPATIENT
Start: 2022-12-17 | End: 2022-12-20 | Stop reason: HOSPADM

## 2022-12-17 RX ORDER — LEVOTHYROXINE SODIUM 0.12 MG/1
125 TABLET ORAL DAILY
Status: DISCONTINUED | OUTPATIENT
Start: 2022-12-17 | End: 2022-12-20 | Stop reason: HOSPADM

## 2022-12-17 RX ORDER — ALBUTEROL SULFATE 2.5 MG/3ML
2.5 SOLUTION RESPIRATORY (INHALATION)
Status: DISCONTINUED | OUTPATIENT
Start: 2022-12-17 | End: 2022-12-20 | Stop reason: HOSPADM

## 2022-12-17 RX ORDER — PREDNISONE 20 MG/1
40 TABLET ORAL DAILY
Status: DISCONTINUED | OUTPATIENT
Start: 2022-12-19 | End: 2022-12-20 | Stop reason: HOSPADM

## 2022-12-17 RX ORDER — METHYLPREDNISOLONE SODIUM SUCCINATE 40 MG/ML
40 INJECTION, POWDER, LYOPHILIZED, FOR SOLUTION INTRAMUSCULAR; INTRAVENOUS EVERY 6 HOURS
Status: COMPLETED | OUTPATIENT
Start: 2022-12-17 | End: 2022-12-19

## 2022-12-17 RX ORDER — ACETAMINOPHEN 650 MG/1
650 SUPPOSITORY RECTAL EVERY 6 HOURS PRN
Status: DISCONTINUED | OUTPATIENT
Start: 2022-12-17 | End: 2022-12-20 | Stop reason: HOSPADM

## 2022-12-17 RX ORDER — POLYETHYLENE GLYCOL 3350 17 G/17G
17 POWDER, FOR SOLUTION ORAL DAILY PRN
Status: DISCONTINUED | OUTPATIENT
Start: 2022-12-17 | End: 2022-12-20 | Stop reason: HOSPADM

## 2022-12-17 RX ORDER — AMITRIPTYLINE HYDROCHLORIDE 10 MG/1
10 TABLET, FILM COATED ORAL NIGHTLY
Status: DISCONTINUED | OUTPATIENT
Start: 2022-12-17 | End: 2022-12-20 | Stop reason: HOSPADM

## 2022-12-17 RX ORDER — VITAMIN E 268 MG
400 CAPSULE ORAL DAILY
Status: DISCONTINUED | OUTPATIENT
Start: 2022-12-17 | End: 2022-12-20 | Stop reason: HOSPADM

## 2022-12-17 RX ORDER — FUROSEMIDE 40 MG/1
40 TABLET ORAL 2 TIMES DAILY
Status: DISCONTINUED | OUTPATIENT
Start: 2022-12-17 | End: 2022-12-20 | Stop reason: HOSPADM

## 2022-12-17 RX ORDER — ONDANSETRON 4 MG/1
4 TABLET, ORALLY DISINTEGRATING ORAL EVERY 8 HOURS PRN
Status: DISCONTINUED | OUTPATIENT
Start: 2022-12-17 | End: 2022-12-20 | Stop reason: HOSPADM

## 2022-12-17 RX ORDER — CLOPIDOGREL BISULFATE 75 MG/1
75 TABLET ORAL DAILY
Status: DISCONTINUED | OUTPATIENT
Start: 2022-12-17 | End: 2022-12-20 | Stop reason: HOSPADM

## 2022-12-17 RX ORDER — SODIUM CHLORIDE 0.9 % (FLUSH) 0.9 %
5-40 SYRINGE (ML) INJECTION PRN
Status: DISCONTINUED | OUTPATIENT
Start: 2022-12-17 | End: 2022-12-20 | Stop reason: HOSPADM

## 2022-12-17 RX ORDER — IPRATROPIUM BROMIDE AND ALBUTEROL SULFATE 2.5; .5 MG/3ML; MG/3ML
1 SOLUTION RESPIRATORY (INHALATION)
Status: DISCONTINUED | OUTPATIENT
Start: 2022-12-17 | End: 2022-12-20 | Stop reason: HOSPADM

## 2022-12-17 RX ORDER — PANTOPRAZOLE SODIUM 40 MG/1
40 TABLET, DELAYED RELEASE ORAL
Status: DISCONTINUED | OUTPATIENT
Start: 2022-12-17 | End: 2022-12-20 | Stop reason: HOSPADM

## 2022-12-17 RX ORDER — CALCIUM CARBONATE 500(1250)
500 TABLET ORAL DAILY
Status: DISCONTINUED | OUTPATIENT
Start: 2022-12-17 | End: 2022-12-20 | Stop reason: HOSPADM

## 2022-12-17 RX ORDER — BUDESONIDE 0.5 MG/2ML
500 INHALANT ORAL 2 TIMES DAILY
Status: DISCONTINUED | OUTPATIENT
Start: 2022-12-17 | End: 2022-12-20 | Stop reason: HOSPADM

## 2022-12-17 RX ORDER — ACETAMINOPHEN 325 MG/1
650 TABLET ORAL EVERY 6 HOURS PRN
Status: DISCONTINUED | OUTPATIENT
Start: 2022-12-17 | End: 2022-12-20 | Stop reason: HOSPADM

## 2022-12-17 RX ORDER — SODIUM CHLORIDE 0.9 % (FLUSH) 0.9 %
5-40 SYRINGE (ML) INJECTION EVERY 12 HOURS SCHEDULED
Status: DISCONTINUED | OUTPATIENT
Start: 2022-12-17 | End: 2022-12-20 | Stop reason: HOSPADM

## 2022-12-17 RX ADMIN — FUROSEMIDE 40 MG: 40 TABLET ORAL at 17:46

## 2022-12-17 RX ADMIN — METHYLPREDNISOLONE SODIUM SUCCINATE 40 MG: 40 INJECTION, POWDER, FOR SOLUTION INTRAMUSCULAR; INTRAVENOUS at 13:11

## 2022-12-17 RX ADMIN — METHYLPREDNISOLONE SODIUM SUCCINATE 40 MG: 40 INJECTION, POWDER, FOR SOLUTION INTRAMUSCULAR; INTRAVENOUS at 17:46

## 2022-12-17 RX ADMIN — METHYLPREDNISOLONE SODIUM SUCCINATE 40 MG: 40 INJECTION, POWDER, FOR SOLUTION INTRAMUSCULAR; INTRAVENOUS at 23:31

## 2022-12-17 RX ADMIN — PREGABALIN 50 MG: 50 CAPSULE ORAL at 20:51

## 2022-12-17 RX ADMIN — FUROSEMIDE 40 MG: 40 TABLET ORAL at 09:29

## 2022-12-17 RX ADMIN — PREGABALIN 50 MG: 50 CAPSULE ORAL at 09:31

## 2022-12-17 RX ADMIN — IPRATROPIUM BROMIDE AND ALBUTEROL SULFATE 1 AMPULE: .5; 2.5 SOLUTION RESPIRATORY (INHALATION) at 14:31

## 2022-12-17 RX ADMIN — IPRATROPIUM BROMIDE AND ALBUTEROL SULFATE 1 AMPULE: .5; 2.5 SOLUTION RESPIRATORY (INHALATION) at 09:08

## 2022-12-17 RX ADMIN — IPRATROPIUM BROMIDE AND ALBUTEROL SULFATE 1 AMPULE: .5; 2.5 SOLUTION RESPIRATORY (INHALATION) at 21:03

## 2022-12-17 RX ADMIN — METOPROLOL TARTRATE 50 MG: 50 TABLET, FILM COATED ORAL at 20:51

## 2022-12-17 RX ADMIN — IPRATROPIUM BROMIDE AND ALBUTEROL SULFATE 1 AMPULE: .5; 2.5 SOLUTION RESPIRATORY (INHALATION) at 17:04

## 2022-12-17 RX ADMIN — METOPROLOL TARTRATE 50 MG: 50 TABLET, FILM COATED ORAL at 09:29

## 2022-12-17 RX ADMIN — PANTOPRAZOLE SODIUM 40 MG: 40 TABLET, DELAYED RELEASE ORAL at 09:28

## 2022-12-17 RX ADMIN — BUDESONIDE 500 MCG: 0.5 SUSPENSION RESPIRATORY (INHALATION) at 21:03

## 2022-12-17 RX ADMIN — METHYLPREDNISOLONE SODIUM SUCCINATE 40 MG: 40 INJECTION, POWDER, FOR SOLUTION INTRAMUSCULAR; INTRAVENOUS at 05:43

## 2022-12-17 RX ADMIN — AMITRIPTYLINE HYDROCHLORIDE 10 MG: 10 TABLET, FILM COATED ORAL at 21:29

## 2022-12-17 RX ADMIN — PREGABALIN 50 MG: 50 CAPSULE ORAL at 13:10

## 2022-12-17 RX ADMIN — ENOXAPARIN SODIUM 30 MG: 100 INJECTION SUBCUTANEOUS at 13:11

## 2022-12-17 RX ADMIN — CLOPIDOGREL BISULFATE 75 MG: 75 TABLET ORAL at 09:29

## 2022-12-17 RX ADMIN — SODIUM CHLORIDE, PRESERVATIVE FREE 10 ML: 5 INJECTION INTRAVENOUS at 20:49

## 2022-12-17 RX ADMIN — BUDESONIDE 500 MCG: 0.5 SUSPENSION RESPIRATORY (INHALATION) at 09:08

## 2022-12-17 RX ADMIN — SODIUM CHLORIDE, PRESERVATIVE FREE 10 ML: 5 INJECTION INTRAVENOUS at 13:11

## 2022-12-17 RX ADMIN — ENOXAPARIN SODIUM 30 MG: 100 INJECTION SUBCUTANEOUS at 20:52

## 2022-12-17 ASSESSMENT — PAIN SCALES - GENERAL
PAINLEVEL_OUTOF10: 0

## 2022-12-17 NOTE — ED NOTES
Called report to [facility] for pt to transfer to [facility].      Pamella Licea RN  12/17/22 4030

## 2022-12-17 NOTE — PROGRESS NOTES
Hospitalist Progress Note      Synopsis:   Briefly, patient with PMH significant for alcohol dependence, chronic low back pain, COPD on 4 L nasal cannula chronically, coronary arterial sclerosis, depression, HPL, HPT, osteomyelitis, pneumonia, and thyroid disease presented to ED for shortness of breath. Allegedly home O2 was removed due to patient smoking and is shortness of breath has gotten increasing worse over the past 4 weeks. Laboratory studies in ED significant for proBNP 4760, troponin 79 with repeat 81 and hemoglobin of 9.9. Chest x-ray showed mild bibasilar infiltrates and interstitial markings. Hospital day 1     Subjective:  Stable overnight. No issues reported. Patient seen and examined  Records reviewed. Temp (24hrs), Av.2 °F (36.8 °C), Min:98.2 °F (36.8 °C), Max:98.2 °F (36.8 °C)    DIET: ADULT DIET; Regular  CODE: Full Code  No intake or output data in the 24 hours ending 22 1023    Review of Systems: All bolded are positive; please see HPI  General:  Fever, chills, diaphoresis, fatigue, malaise, night sweats, weight loss  Psychological:  Anxiety, disorientation, hallucinations. ENT:  Epistaxis, headaches, vertigo, visual changes. Cardiovascular:  Chest pain, irregular heartbeats, palpitations, paroxysmal nocturnal dyspnea. Respiratory:  Shortness of breath, coughing, sputum production, hemoptysis, wheezing, orthopnea.   Gastrointestinal:  Nausea, vomiting, diarrhea, heartburn, constipation, abdominal pain, hematemesis, hematochezia, melena, acholic stools  Genito-Urinary:  Dysuria, urgency, frequency, hematuria  Musculoskeletal:  Joint pain, joint stiffness, joint swelling, muscle pain  Neurology:  Headache, focal neurological deficits, weakness, numbness, paresthesia  Derm:  Rashes, ulcers, excoriations, bruising  Extremities:  Decreased ROM, peripheral edema, mottling    Objective:    BP 96/60   Pulse 60   Temp 98.2 °F (36.8 °C)   Resp 19   Ht 6' 0.05\" (1.83 m) SpO2 93%   BMI 31.15 kg/m²     General appearance: No apparent distress, appears stated age and cooperative. HEENT: Conjunctivae/corneas clear. Mucous membranes moist.  Neck: Supple. No JVD. Respiratory:  Clear to auscultation bilaterally. Normal respiratory effort. Cardiovascular:  RRR. S1, S2 without MRG. PV: Pulses palpable. No edema. Abdomen: Soft, non-tender, non-distended. +BS  Musculoskeletal: No obvious deformities. Skin: Normal skin color. No rashes or lesions. Good turgor. Neurologic:  Grossly non-focal. Awake, alert, following commands.    Psychiatric: Alert and oriented, thought content appropriate, normal insight and judgement    Medications:  REVIEWED DAILY    Infusion Medications    sodium chloride       Scheduled Medications    amitriptyline  10 mg Oral Nightly    calcium elemental  500 mg Oral Daily    clopidogrel  75 mg Oral Daily    furosemide  40 mg Oral BID    levothyroxine  125 mcg Oral Daily    metoprolol tartrate  50 mg Oral BID    pantoprazole  40 mg Oral QAM AC    pregabalin  50 mg Oral TID    vitamin E  400 Units Oral Daily    sodium chloride flush  5-40 mL IntraVENous 2 times per day    enoxaparin  40 mg SubCUTAneous Daily    ipratropium-albuterol  1 ampule Inhalation Q4H WA    budesonide  500 mcg Nebulization BID    methylPREDNISolone  40 mg IntraVENous Q6H    Followed by    Paul Diallo ON 12/19/2022] predniSONE  40 mg Oral Daily     PRN Meds: sodium chloride flush, sodium chloride, ondansetron **OR** ondansetron, polyethylene glycol, acetaminophen **OR** acetaminophen, albuterol    Labs:     Recent Labs     12/16/22  1557 12/17/22  0533   WBC 7.0 7.5   HGB 11.9* 13.2   HCT 35.6* 38.6    277       Recent Labs     12/16/22  1557 12/17/22  0533    136   K 3.8 4.7    103   CO2 26 22   BUN 10 12   CREATININE 0.9 0.7   CALCIUM 9.1 9.1       Recent Labs     12/16/22  1557 12/17/22  0533   PROT 6.5 7.0   ALKPHOS 96 102   ALT 14 16   AST 16 20   BILITOT 0.7 0.9 No results for input(s): INR in the last 72 hours. No results for input(s): Trena Shivanii in the last 72 hours. Chronic labs:    Lab Results   Component Value Date    TSH 5.610 (H) 01/16/2018    INR 1.2 01/30/2018       Radiology: REVIEWED DAILY    Assessment:  Acute hypoxic respiratory failure likely secondary to COPD exacerbation  COPD exacerbation  Stage I diastolic heart failure, echo performed on 1/31/2018 showed normal left ventricle size and systolic function, stage I diastolic dysfunction, trace mild regurgitation and mild aortic stenosis  Hypertension  Hyperlipidemia  Anxiety/depression  Chronic anemia  Plan:  DuoNebs every 4 hours  Pulmicort twice daily  Albuterol as needed  Methylprednisone 40 mg IV every 6 hours  Supplemental oxygen to maintain SPO2 greater than 93%  Continue home medications Lasix, Lopressor, Elavil, Synthroid, Plavix, Lyrica      DVT Prophylaxis [x] Lovenox  []  Heparin [] DOAC [] PCDs [] Ambulation    GI Prophylaxis [] PPI  [] H2 Blocker   [] Carafate  [x] Diet/Tube Feeds   Level of care [] Med/Surg  [x] Intermediate  []  ICU   Diet ADULT DIET; Regular    Family contact [x]  N/A    [] At bedside  [] Phone call   Disposition Patient requires continued admission for evaluation and treatment of COPD exacerbation   MDM [] Low    [x] Moderate  []   High       Discharge Plan: To home when clinically stable    +++++++++++++++++++++++++++++++++++++++++++++++++  ANDREA Soto Physician - [address] [place] [address]  +++++++++++++++++++++++++++++++++++++++++++++++++  NOTE: This report was transcribed using voice recognition software. Every effort was made to ensure accuracy; however, inadvertent computerized transcription errors may be present.

## 2022-12-17 NOTE — ED NOTES
Patient continues to take of blood pressure cuff, monitor leads, and pulse ox.      Kristi Hodgkins, RN  12/17/22 1457

## 2022-12-17 NOTE — PROGRESS NOTES
This patient is on medication that requires renal, weight, and/or indication dose adjustment. Date Body Weight IBW  Adjusted BW SCr  CrCl Dialysis status   12/17/2022 230 lb (104.3 kg) Ideal body weight: 77.7 kg (171 lb 5 oz)  Adjusted ideal body weight: 88.4 kg (194 lb 12.6 oz) Serum creatinine: 0.7 mg/dL 12/17/22 0533  Estimated creatinine clearance: 121 mL/min N/a       Pharmacy has dose-adjusted the following medication(s):    Date Previous Order Adjusted Order   12/17/2022 Lovenox 40 mg QD Lovenox 30 mg BID       These changes were made per protocol according to the [address] for Pharmacists. *Please note this dose may need readjusted if patient's condition changes. Please contact pharmacy with any questions regarding these changes.     Jose C Adair, [facility]  12/17/2022  11:07 AM

## 2022-12-17 NOTE — ED NOTES
Pt out of bed leaning over garbage can with oxygen off. Audible wheezing present. Pt assisted back to bed and placed back on oxygen and monitor. VSS. Will continue to monitor.      Jung Lee RN  12/17/22 1070

## 2022-12-17 NOTE — H&P
Hospitalist History & Physical      PCP: Mary Lou Lima MD    Date of Service: Pt seen/examined on 12/16/2022     Chief Complaint:  had concerns including Shortness of Breath (Pt sent in from the [facility] due to hypoxia. Pt denies sob. Pt was 98% on 2L NC for EMS). History Of Present Illness:    Mr. Marysol Ibarra, a 71y.o. year old male  who  has a past medical history of Alcohol dependence ([address] Arthritis, Chronic low back pain, COPD (chronic obstructive pulmonary disease) ([address] Coronary arteriosclerosis, Depression, Hyperlipidemia, Hypertension, Osteomyelitis ([address] Pneumonia, Rhinitis, Spinal stenosis, lumbar, and Thyroid disease. Marysol Ibarra is a 71 y.o. male presenting to the ED for shortness of breath. The history is obtained from patient as well as patient's medical record. The patient is presenting to the emergency department for shortness of breath. The patient was sent from the [facility] for hypoxia. The patient has a history of COPD. The patient is to be on 4 L nasal cannula chronically. According to the patient approximately 4 weeks ago the oxygen was removed to his home secondary to him \"smoking. \"Patient states that since he has had shortness of breath which is moderate in severity. Worsened by activity and exertion as well as not having his oxygen. Nothing makes this better. He has not tried any treatments prior to arrival.  He denies any fevers, nausea, vomiting, abdominal pain, dysuria, diarrhea or constipation. He does have a history of hypertension, hyperlipidemia. He is not diabetic. Signs within normal limits and stable. The patient is afebrile. Laboratory studies demonstrate proBNP 4760, troponin 79 with repeat 81, hemoglobin 9.9.       Past Medical History:   Diagnosis Date    Alcohol dependence ([address]     Arthritis     Chronic low back pain     COPD (chronic obstructive pulmonary disease) (HCC)     Coronary arteriosclerosis     Depression     Hyperlipidemia Hypertension     Osteomyelitis (HCC)     mandible     Pneumonia     Rhinitis     Spinal stenosis, lumbar     Thyroid disease        Past Surgical History:   Procedure Laterality Date    CORONARY ARTERY BYPASS GRAFT  2011       Prior to Admission medications    Medication Sig Start Date End Date Taking? Authorizing Provider   budesonide-formoterol (SYMBICORT) 160-4.5 MCG/ACT AERO Inhale 2 puffs into the lungs 2 times daily 6/30/20   Robb Reis MD   diclofenac sodium 1 % GEL Apply 2 g topically 2 times daily    Historical Provider, MD   BUDESONIDE PO Take by mouth    Historical Provider, MD   miconazole (MICOTIN) 2 % cream Apply topically 2 times daily Apply topically 2 times daily. Historical Provider, MD   Rosuvastatin Calcium 40 MG CPSP Take by mouth    Historical Provider, MD   metoprolol tartrate (LOPRESSOR) 50 MG tablet Take 50 mg by mouth 2 times daily    Historical Provider, MD   tiotropium (SPIRIVA) 18 MCG inhalation capsule Inhale 2.5 mcg into the lungs daily    Historical Provider, MD   albuterol (ACCUNEB) 0.63 MG/3ML nebulizer solution Take 1 ampule by nebulization every 6 hours as needed for Wheezing    Historical Provider, MD   pregabalin (LYRICA) 50 MG capsule Take 50 mg by mouth 3 times daily. Historical Provider, MD   pantoprazole (PROTONIX) 40 MG tablet Take 40 mg by mouth daily    Historical Provider, MD   Omega-3 Fatty Acids (FISH OIL) 500 MG CAPS Take by mouth    Historical Provider, MD   raNITIdine HCl (RANITIDINE 150 MAX STRENGTH PO) Take 300 mg by mouth    Historical Provider, MD   furosemide (LASIX) 40 MG tablet Take 40 mg by mouth 2 times daily    Historical Provider, MD   traMADol (ULTRAM) 50 MG tablet Take 50 mg by mouth every 6 hours as needed for Pain.     Historical Provider, MD   clopidogrel (PLAVIX) 75 MG tablet Take 75 mg by mouth daily    Historical Provider, MD   levothyroxine (SYNTHROID) 125 MCG tablet Take 125 mcg by mouth Daily    Historical Provider, MD amitriptyline (ELAVIL) 10 MG tablet Take 10 mg by mouth nightly    Historical Provider, MD   vitamin E 400 UNIT capsule Take 400 Units by mouth daily    Historical Provider, MD   calcium carbonate (OSCAL) 500 MG TABS tablet Take 500 mg by mouth daily    Historical Provider, MD   Multiple Vitamins-Minerals (MULTIVITAL-M PO) Take by mouth    Historical Provider, MD   LUTEIN-ZEAXANTHIN PO Take 1 tablet by mouth    Historical Provider, MD   OXYGEN Inhale into the lungs    Historical Provider, MD         Allergies:  Tetracyclines & related    Social History:    TOBACCO:   reports that he quit smoking about 6 years ago. His smoking use included cigarettes. He started smoking about 56 years ago. He has a 75.00 pack-year smoking history. He uses smokeless tobacco.  ETOH:   reports current alcohol use of about 12.0 standard drinks per week. Family History:    Reviewed in detail and negative for DM, CAD, Cancer, CVA. Positive as follows\"      Problem Relation Age of Onset    Heart Disease Mother     Diabetes Mother     Stroke Mother     Heart Disease Father     Diabetes Father     Stroke Father     Cancer Brother        REVIEW OF SYSTEMS:   Pertinent positives as noted in the HPI. All other systems reviewed and negative. PHYSICAL EXAM:  BP (!) 132/93   Pulse 71   Temp 98.2 °F (36.8 °C)   Resp 23   SpO2 97%   General appearance: No apparent distress, appears stated age and cooperative. HEENT: Normal cephalic, atraumatic without obvious deformity. Pupils equal, round, and reactive to light. Extra ocular muscles intact. Conjunctivae/corneas clear. Neck: Supple, with full range of motion. No jugular venous distention. Trachea midline. Respiratory: Scattered wheezes bilaterally  Cardiovascular: Regular rate and rhythm  Abdomen: Soft, nontender, nondistended  Musculoskeletal: No clubbing, cyanosis, edema of bilateral lower extremities. Brisk capillary refill. Skin: Normal skin color.   No rashes or lesions. Neurologic:  Neurovascularly intact without any focal sensory/motor deficits. Cranial nerves: II-XII intact, grossly non-focal.  Psychiatric: Alert and oriented, thought content appropriate, normal insight    Reviewed EKG and CXR personally      CBC:   Recent Labs     12/16/22  1557   WBC 7.0   RBC 3.69*   HGB 11.9*   HCT 35.6*   MCV 96.5   RDW 13.3        BMP:   Recent Labs     12/16/22  1557      K 3.8      CO2 26   BUN 10   CREATININE 0.9     LFT:  Recent Labs     12/16/22  1557   PROT 6.5   ALKPHOS 96   ALT 14   AST 16   BILITOT 0.7     CE:  No results for input(s): Oskar Katy in the last 72 hours. PT/INR: No results for input(s): INR, APTT in the last 72 hours. BNP: No results for input(s): BNP in the last 72 hours. ESR: No results found for: SEDRATE  CRP: No results found for: CRP  D Dimer:   Lab Results   Component Value Date    DDIMER 428 01/03/2018      Folate and B12: No results found for: Prakash Mcgowano, No results found for: FOLATE  Lactic Acid:   Lab Results   Component Value Date    LACTA 1.1 02/04/2018     Thyroid Studies:   Lab Results   Component Value Date    TSH 5.610 (H) 01/16/2018       Oupatient labs:  Lab Results   Component Value Date    TSH 5.610 (H) 01/16/2018    INR 1.2 01/30/2018       Urinalysis:  No results found for: NITRU, WBCUA, BACTERIA, RBCUA, BLOODU, SPECGRAV, GLUCOSEU    Imaging:  XR CHEST PORTABLE    Result Date: 12/16/2022  EXAMINATION: ONE XRAY VIEW OF THE CHEST 12/16/2022 3:39 pm COMPARISON: None. HISTORY: ORDERING SYSTEM PROVIDED HISTORY: shortness of breath TECHNOLOGIST PROVIDED HISTORY: Reason for exam:->shortness of breath What reading provider will be dictating this exam?->CRC FINDINGS: There are bibasilar patchy opacities. The upper lungs are clear. The heart is mildly enlarged. No large pneumothorax or pleural effusion. Mild bibasilar infiltrates and interstitial markings.        ASSESSMENT:  -COPD exacerbation  -Hypertension  -Hyperlipidemia  -Anxiety/depression  -Chronic anemia      PLAN:  -Admit to medicine  -DuoNebs every 4 hours  -Pulmicort twice daily  -Albuterol as needed  -Methylprednisolone 40 mg IV every 6 hours  -Continue home medications        Diet: No diet orders on file  Code Status: Prior  Surrogate decision maker confirmed with patient:   No emergency contact information on file. DVT Prophylaxis: []Lovenox []Heparin []PCD [] [address] []Encouraged ambulation  Disposition: []Med/Surg [] Intermediate [] ICU/CCU  Admit status: [] Observation [] Inpatient     +++++++++++++++++++++++++++++++++++++++++++++++++  Christian Sniff, DO  +++++++++++++++++++++++++++++++++++++++++++++++++  NOTE: This report was transcribed using voice recognition software. Every effort was made to ensure accuracy; however, inadvertent computerized transcription errors may be present.

## 2022-12-18 LAB
ALBUMIN SERPL-MCNC: 3.6 G/DL (ref 3.5–5.2)
ALP BLD-CCNC: 96 U/L (ref 40–129)
ALT SERPL-CCNC: 15 U/L (ref 0–40)
ANION GAP SERPL CALCULATED.3IONS-SCNC: 12 MMOL/L (ref 7–16)
AST SERPL-CCNC: 16 U/L (ref 0–39)
BASOPHILS ABSOLUTE: 0.02 E9/L (ref 0–0.2)
BASOPHILS RELATIVE PERCENT: 0.1 % (ref 0–2)
BILIRUB SERPL-MCNC: 0.6 MG/DL (ref 0–1.2)
BUN BLDV-MCNC: 25 MG/DL (ref 6–23)
CALCIUM SERPL-MCNC: 9 MG/DL (ref 8.6–10.2)
CHLORIDE BLD-SCNC: 103 MMOL/L (ref 98–107)
CO2: 22 MMOL/L (ref 22–29)
CREAT SERPL-MCNC: 0.9 MG/DL (ref 0.7–1.2)
EOSINOPHILS ABSOLUTE: 0 E9/L (ref 0.05–0.5)
EOSINOPHILS RELATIVE PERCENT: 0 % (ref 0–6)
GFR SERPL CREATININE-BSD FRML MDRD: >60 ML/MIN/1.73
GLUCOSE BLD-MCNC: 142 MG/DL (ref 74–99)
HCT VFR BLD CALC: 36.9 % (ref 37–54)
HEMOGLOBIN: 12.4 G/DL (ref 12.5–16.5)
IMMATURE GRANULOCYTES #: 0.1 E9/L
IMMATURE GRANULOCYTES %: 0.7 % (ref 0–5)
LYMPHOCYTES ABSOLUTE: 0.96 E9/L (ref 1.5–4)
LYMPHOCYTES RELATIVE PERCENT: 6.7 % (ref 20–42)
MCH RBC QN AUTO: 32.7 PG (ref 26–35)
MCHC RBC AUTO-ENTMCNC: 33.6 % (ref 32–34.5)
MCV RBC AUTO: 97.4 FL (ref 80–99.9)
MONOCYTES ABSOLUTE: 0.52 E9/L (ref 0.1–0.95)
MONOCYTES RELATIVE PERCENT: 3.6 % (ref 2–12)
NEUTROPHILS ABSOLUTE: 12.71 E9/L (ref 1.8–7.3)
NEUTROPHILS RELATIVE PERCENT: 88.9 % (ref 43–80)
PDW BLD-RTO: 13.4 FL (ref 11.5–15)
PLATELET # BLD: 267 E9/L (ref 130–450)
PMV BLD AUTO: 11.1 FL (ref 7–12)
POTASSIUM REFLEX MAGNESIUM: 4.6 MMOL/L (ref 3.5–5)
RBC # BLD: 3.79 E12/L (ref 3.8–5.8)
SODIUM BLD-SCNC: 137 MMOL/L (ref 132–146)
TOTAL PROTEIN: 6.3 G/DL (ref 6.4–8.3)
WBC # BLD: 14.3 E9/L (ref 4.5–11.5)

## 2022-12-18 PROCEDURE — 36415 COLL VENOUS BLD VENIPUNCTURE: CPT

## 2022-12-18 PROCEDURE — 85025 COMPLETE CBC W/AUTO DIFF WBC: CPT

## 2022-12-18 PROCEDURE — 6370000000 HC RX 637 (ALT 250 FOR IP): Performed by: FAMILY MEDICINE

## 2022-12-18 PROCEDURE — 6360000002 HC RX W HCPCS: Performed by: FAMILY MEDICINE

## 2022-12-18 PROCEDURE — 2580000003 HC RX 258: Performed by: FAMILY MEDICINE

## 2022-12-18 PROCEDURE — 2700000000 HC OXYGEN THERAPY PER DAY

## 2022-12-18 PROCEDURE — 80053 COMPREHEN METABOLIC PANEL: CPT

## 2022-12-18 PROCEDURE — 2060000000 HC ICU INTERMEDIATE R&B

## 2022-12-18 PROCEDURE — 94640 AIRWAY INHALATION TREATMENT: CPT

## 2022-12-18 RX ADMIN — FUROSEMIDE 40 MG: 40 TABLET ORAL at 08:23

## 2022-12-18 RX ADMIN — BUDESONIDE 500 MCG: 0.5 SUSPENSION RESPIRATORY (INHALATION) at 12:37

## 2022-12-18 RX ADMIN — ENOXAPARIN SODIUM 30 MG: 100 INJECTION SUBCUTANEOUS at 08:24

## 2022-12-18 RX ADMIN — AMITRIPTYLINE HYDROCHLORIDE 10 MG: 10 TABLET, FILM COATED ORAL at 20:47

## 2022-12-18 RX ADMIN — PREGABALIN 50 MG: 50 CAPSULE ORAL at 20:49

## 2022-12-18 RX ADMIN — PREGABALIN 50 MG: 50 CAPSULE ORAL at 08:23

## 2022-12-18 RX ADMIN — CALCIUM 500 MG: 500 TABLET ORAL at 08:23

## 2022-12-18 RX ADMIN — CLOPIDOGREL BISULFATE 75 MG: 75 TABLET ORAL at 08:23

## 2022-12-18 RX ADMIN — METOPROLOL TARTRATE 50 MG: 50 TABLET, FILM COATED ORAL at 08:23

## 2022-12-18 RX ADMIN — METOPROLOL TARTRATE 50 MG: 50 TABLET, FILM COATED ORAL at 20:49

## 2022-12-18 RX ADMIN — Medication 400 UNITS: at 08:23

## 2022-12-18 RX ADMIN — LEVOTHYROXINE SODIUM 125 MCG: 0.12 TABLET ORAL at 05:52

## 2022-12-18 RX ADMIN — METHYLPREDNISOLONE SODIUM SUCCINATE 40 MG: 40 INJECTION, POWDER, FOR SOLUTION INTRAMUSCULAR; INTRAVENOUS at 12:05

## 2022-12-18 RX ADMIN — PANTOPRAZOLE SODIUM 40 MG: 40 TABLET, DELAYED RELEASE ORAL at 05:52

## 2022-12-18 RX ADMIN — METHYLPREDNISOLONE SODIUM SUCCINATE 40 MG: 40 INJECTION, POWDER, FOR SOLUTION INTRAMUSCULAR; INTRAVENOUS at 05:52

## 2022-12-18 RX ADMIN — ENOXAPARIN SODIUM 30 MG: 100 INJECTION SUBCUTANEOUS at 20:48

## 2022-12-18 RX ADMIN — FUROSEMIDE 40 MG: 40 TABLET ORAL at 17:57

## 2022-12-18 RX ADMIN — METHYLPREDNISOLONE SODIUM SUCCINATE 40 MG: 40 INJECTION, POWDER, FOR SOLUTION INTRAMUSCULAR; INTRAVENOUS at 17:55

## 2022-12-18 RX ADMIN — SODIUM CHLORIDE, PRESERVATIVE FREE 10 ML: 5 INJECTION INTRAVENOUS at 20:47

## 2022-12-18 RX ADMIN — IPRATROPIUM BROMIDE AND ALBUTEROL SULFATE 1 AMPULE: .5; 2.5 SOLUTION RESPIRATORY (INHALATION) at 12:35

## 2022-12-18 ASSESSMENT — PAIN SCALES - GENERAL
PAINLEVEL_OUTOF10: 0

## 2022-12-18 NOTE — PROGRESS NOTES
Hospitalist Progress Note      Synopsis:   Briefly, patient with PMH significant for alcohol dependence, chronic low back pain, COPD on 4 L nasal cannula chronically, coronary arterial sclerosis, depression, HPL, HPT, osteomyelitis, pneumonia, and thyroid disease presented to ED for shortness of breath. Allegedly home O2 was removed due to patient smoking and is shortness of breath has gotten increasing worse over the past 4 weeks. Laboratory studies in ED significant for proBNP 4760, troponin 79 with repeat 81 and hemoglobin of 9.9. Chest x-ray showed mild bibasilar infiltrates and interstitial markings. Hospital day 2     Subjective:  Stable overnight. No issues reported. Patient seen and examined  Records reviewed. Temp (24hrs), Av.5 °F (36.4 °C), Min:97.3 °F (36.3 °C), Max:97.6 °F (36.4 °C)    DIET: ADULT DIET; Regular  CODE: Full Code  No intake or output data in the 24 hours ending 22 0943    Review of Systems: All bolded are positive; please see HPI  General:  Fever, chills, diaphoresis, fatigue, malaise, night sweats, weight loss  Psychological:  Anxiety, disorientation, hallucinations. ENT:  Epistaxis, headaches, vertigo, visual changes. Cardiovascular:  Chest pain, irregular heartbeats, palpitations, paroxysmal nocturnal dyspnea. Respiratory:  Shortness of breath, coughing, sputum production, hemoptysis, wheezing, orthopnea.   Gastrointestinal:  Nausea, vomiting, diarrhea, heartburn, constipation, abdominal pain, hematemesis, hematochezia, melena, acholic stools  Genito-Urinary:  Dysuria, urgency, frequency, hematuria  Musculoskeletal:  Joint pain, joint stiffness, joint swelling, muscle pain  Neurology:  Headache, focal neurological deficits, weakness, numbness, paresthesia  Derm:  Rashes, ulcers, excoriations, bruising  Extremities:  Decreased ROM, peripheral edema, mottling    Objective:    BP (!) 102/58   Pulse 58   Temp 97.6 °F (36.4 °C)   Resp 16   Ht 6' 0.05\" (1.83 m)   Wt 230 lb (104.3 kg)   SpO2 100%   BMI 31.15 kg/m²     General appearance: No apparent distress, appears stated age and cooperative. HEENT: Conjunctivae/corneas clear. Mucous membranes moist.  Neck: Supple. No JVD. Respiratory:  Clear to auscultation bilaterally. Normal respiratory effort. Cardiovascular:  RRR. S1, S2 without MRG. PV: Pulses palpable. No edema. Abdomen: Soft, non-tender, non-distended. +BS  Musculoskeletal: No obvious deformities. Skin: Normal skin color. No rashes or lesions. Good turgor. Neurologic:  Grossly non-focal. Awake, alert, following commands.    Psychiatric: Alert and oriented, thought content appropriate, normal insight and judgement    Medications:  REVIEWED DAILY    Infusion Medications    sodium chloride       Scheduled Medications    amitriptyline  10 mg Oral Nightly    calcium elemental  500 mg Oral Daily    clopidogrel  75 mg Oral Daily    furosemide  40 mg Oral BID    levothyroxine  125 mcg Oral Daily    metoprolol tartrate  50 mg Oral BID    pantoprazole  40 mg Oral QAM AC    pregabalin  50 mg Oral TID    vitamin E  400 Units Oral Daily    sodium chloride flush  5-40 mL IntraVENous 2 times per day    enoxaparin  30 mg SubCUTAneous BID    ipratropium-albuterol  1 ampule Inhalation Q4H WA    budesonide  500 mcg Nebulization BID    methylPREDNISolone  40 mg IntraVENous Q6H    Followed by    [facility]bone ON 12/19/2022] predniSONE  40 mg Oral Daily     PRN Meds: sodium chloride flush, sodium chloride, ondansetron **OR** ondansetron, polyethylene glycol, acetaminophen **OR** acetaminophen, albuterol    Labs:     Recent Labs     12/16/22  1557 12/17/22  0533 12/18/22  0602   WBC 7.0 7.5 14.3*   HGB 11.9* 13.2 12.4*   HCT 35.6* 38.6 36.9*    277 267       Recent Labs     12/16/22  1557 12/17/22  0533 12/18/22  0602    136 137   K 3.8 4.7 4.6    103 103   CO2 26 22 22   BUN 10 12 25*   CREATININE 0.9 0.7 0.9   CALCIUM 9.1 9.1 9.0       Recent Labs 12/16/22  1557 12/17/22  0533 12/18/22  0602   PROT 6.5 7.0 6.3*   ALKPHOS 96 102 96   ALT 14 16 15   AST 16 20 16   BILITOT 0.7 0.9 0.6       No results for input(s): INR in the last 72 hours. No results for input(s): Shannon Tracy in the last 72 hours. Chronic labs:    Lab Results   Component Value Date    TSH 5.610 (H) 01/16/2018    INR 1.2 01/30/2018       Radiology: REVIEWED DAILY    Assessment:  Acute hypoxic respiratory failure likely secondary to COPD exacerbation  COPD exacerbation  Stage I diastolic heart failure, echo performed on 1/31/2018 showed normal left ventricle size and systolic function, stage I diastolic dysfunction, trace mild regurgitation and mild aortic stenosis  Hypertension  Hyperlipidemia  Anxiety/depression  Chronic anemia  Plan:  DuoNebs every 4 hours  Pulmicort twice daily  Albuterol as needed  Methylprednisone 40 mg IV every 6 hours  Supplemental oxygen to maintain SPO2 greater than 93%  Continue home medications Lasix, Lopressor, Elavil, Synthroid, Plavix, Lyrica      DVT Prophylaxis [x] Lovenox  []  Heparin [] DOAC [] PCDs [] Ambulation    GI Prophylaxis [] PPI  [] H2 Blocker   [] Carafate  [x] Diet/Tube Feeds   Level of care [] Med/Surg  [x] Intermediate  []  ICU   Diet ADULT DIET; Regular    Family contact [x]  N/A    [] At bedside  [] Phone call   Disposition Patient requires continued admission for evaluation and treatment of COPD exacerbation   MDM [] Low    [x] Moderate  []   High       Discharge Plan: To home when clinically stable    +++++++++++++++++++++++++++++++++++++++++++++++++  ANDREA Saunders Physician - [address] [place] [address]  +++++++++++++++++++++++++++++++++++++++++++++++++  NOTE: This report was transcribed using voice recognition software. Every effort was made to ensure accuracy; however, inadvertent computerized transcription errors may be present.

## 2022-12-19 LAB
ALBUMIN SERPL-MCNC: 3.6 G/DL (ref 3.5–5.2)
ALP BLD-CCNC: 93 U/L (ref 40–129)
ALT SERPL-CCNC: 17 U/L (ref 0–40)
ANION GAP SERPL CALCULATED.3IONS-SCNC: 11 MMOL/L (ref 7–16)
AST SERPL-CCNC: 15 U/L (ref 0–39)
BASOPHILS ABSOLUTE: 0.01 E9/L (ref 0–0.2)
BASOPHILS RELATIVE PERCENT: 0.1 % (ref 0–2)
BILIRUB SERPL-MCNC: 0.5 MG/DL (ref 0–1.2)
BUN BLDV-MCNC: 27 MG/DL (ref 6–23)
CALCIUM SERPL-MCNC: 8.9 MG/DL (ref 8.6–10.2)
CHLORIDE BLD-SCNC: 102 MMOL/L (ref 98–107)
CO2: 25 MMOL/L (ref 22–29)
CREAT SERPL-MCNC: 0.8 MG/DL (ref 0.7–1.2)
EKG ATRIAL RATE: 39 BPM
EKG P AXIS: 80 DEGREES
EKG P-R INTERVAL: 210 MS
EKG Q-T INTERVAL: 480 MS
EKG QRS DURATION: 110 MS
EKG QTC CALCULATION (BAZETT): 446 MS
EKG R AXIS: -38 DEGREES
EKG T AXIS: 77 DEGREES
EKG VENTRICULAR RATE: 52 BPM
EOSINOPHILS ABSOLUTE: 0 E9/L (ref 0.05–0.5)
EOSINOPHILS RELATIVE PERCENT: 0 % (ref 0–6)
GFR SERPL CREATININE-BSD FRML MDRD: >60 ML/MIN/1.73
GLUCOSE BLD-MCNC: 131 MG/DL (ref 74–99)
HCT VFR BLD CALC: 40.4 % (ref 37–54)
HEMOGLOBIN: 13.3 G/DL (ref 12.5–16.5)
IMMATURE GRANULOCYTES #: 0.21 E9/L
IMMATURE GRANULOCYTES %: 1.3 % (ref 0–5)
LYMPHOCYTES ABSOLUTE: 0.7 E9/L (ref 1.5–4)
LYMPHOCYTES RELATIVE PERCENT: 4.3 % (ref 20–42)
MCH RBC QN AUTO: 32.5 PG (ref 26–35)
MCHC RBC AUTO-ENTMCNC: 32.9 % (ref 32–34.5)
MCV RBC AUTO: 98.8 FL (ref 80–99.9)
MONOCYTES ABSOLUTE: 0.46 E9/L (ref 0.1–0.95)
MONOCYTES RELATIVE PERCENT: 2.8 % (ref 2–12)
NEUTROPHILS ABSOLUTE: 14.81 E9/L (ref 1.8–7.3)
NEUTROPHILS RELATIVE PERCENT: 91.5 % (ref 43–80)
PDW BLD-RTO: 13.4 FL (ref 11.5–15)
PLATELET # BLD: 251 E9/L (ref 130–450)
PMV BLD AUTO: 10.9 FL (ref 7–12)
POTASSIUM REFLEX MAGNESIUM: 4.7 MMOL/L (ref 3.5–5)
RBC # BLD: 4.09 E12/L (ref 3.8–5.8)
SODIUM BLD-SCNC: 138 MMOL/L (ref 132–146)
TOTAL PROTEIN: 6.5 G/DL (ref 6.4–8.3)
WBC # BLD: 16.2 E9/L (ref 4.5–11.5)

## 2022-12-19 PROCEDURE — 97165 OT EVAL LOW COMPLEX 30 MIN: CPT

## 2022-12-19 PROCEDURE — 6370000000 HC RX 637 (ALT 250 FOR IP): Performed by: FAMILY MEDICINE

## 2022-12-19 PROCEDURE — 6360000002 HC RX W HCPCS: Performed by: FAMILY MEDICINE

## 2022-12-19 PROCEDURE — 80053 COMPREHEN METABOLIC PANEL: CPT

## 2022-12-19 PROCEDURE — 93010 ELECTROCARDIOGRAM REPORT: CPT | Performed by: INTERNAL MEDICINE

## 2022-12-19 PROCEDURE — 36415 COLL VENOUS BLD VENIPUNCTURE: CPT

## 2022-12-19 PROCEDURE — 94640 AIRWAY INHALATION TREATMENT: CPT

## 2022-12-19 PROCEDURE — 2060000000 HC ICU INTERMEDIATE R&B

## 2022-12-19 PROCEDURE — 85025 COMPLETE CBC W/AUTO DIFF WBC: CPT

## 2022-12-19 PROCEDURE — 2580000003 HC RX 258: Performed by: FAMILY MEDICINE

## 2022-12-19 PROCEDURE — 2700000000 HC OXYGEN THERAPY PER DAY

## 2022-12-19 PROCEDURE — 97530 THERAPEUTIC ACTIVITIES: CPT

## 2022-12-19 RX ADMIN — PANTOPRAZOLE SODIUM 40 MG: 40 TABLET, DELAYED RELEASE ORAL at 05:04

## 2022-12-19 RX ADMIN — IPRATROPIUM BROMIDE AND ALBUTEROL SULFATE 1 AMPULE: .5; 2.5 SOLUTION RESPIRATORY (INHALATION) at 13:50

## 2022-12-19 RX ADMIN — PREGABALIN 50 MG: 50 CAPSULE ORAL at 20:12

## 2022-12-19 RX ADMIN — SODIUM CHLORIDE, PRESERVATIVE FREE 10 ML: 5 INJECTION INTRAVENOUS at 20:13

## 2022-12-19 RX ADMIN — CALCIUM 500 MG: 500 TABLET ORAL at 10:02

## 2022-12-19 RX ADMIN — PREGABALIN 50 MG: 50 CAPSULE ORAL at 10:03

## 2022-12-19 RX ADMIN — METHYLPREDNISOLONE SODIUM SUCCINATE 40 MG: 40 INJECTION, POWDER, FOR SOLUTION INTRAMUSCULAR; INTRAVENOUS at 00:00

## 2022-12-19 RX ADMIN — PREDNISONE 40 MG: 20 TABLET ORAL at 10:03

## 2022-12-19 RX ADMIN — ALBUTEROL SULFATE 2.5 MG: 2.5 SOLUTION RESPIRATORY (INHALATION) at 10:17

## 2022-12-19 RX ADMIN — AMITRIPTYLINE HYDROCHLORIDE 10 MG: 10 TABLET, FILM COATED ORAL at 20:13

## 2022-12-19 RX ADMIN — ACETAMINOPHEN 650 MG: 325 TABLET ORAL at 04:37

## 2022-12-19 RX ADMIN — LEVOTHYROXINE SODIUM 125 MCG: 0.12 TABLET ORAL at 05:03

## 2022-12-19 RX ADMIN — ENOXAPARIN SODIUM 30 MG: 100 INJECTION SUBCUTANEOUS at 10:02

## 2022-12-19 RX ADMIN — SODIUM CHLORIDE, PRESERVATIVE FREE 10 ML: 5 INJECTION INTRAVENOUS at 10:02

## 2022-12-19 RX ADMIN — FUROSEMIDE 40 MG: 40 TABLET ORAL at 17:53

## 2022-12-19 RX ADMIN — CLOPIDOGREL BISULFATE 75 MG: 75 TABLET ORAL at 10:02

## 2022-12-19 RX ADMIN — Medication 400 UNITS: at 10:03

## 2022-12-19 RX ADMIN — FUROSEMIDE 40 MG: 40 TABLET ORAL at 10:03

## 2022-12-19 RX ADMIN — PREGABALIN 50 MG: 50 CAPSULE ORAL at 14:34

## 2022-12-19 RX ADMIN — IPRATROPIUM BROMIDE AND ALBUTEROL SULFATE 1 AMPULE: .5; 2.5 SOLUTION RESPIRATORY (INHALATION) at 20:14

## 2022-12-19 RX ADMIN — BUDESONIDE 500 MCG: 0.5 SUSPENSION RESPIRATORY (INHALATION) at 20:14

## 2022-12-19 RX ADMIN — IPRATROPIUM BROMIDE AND ALBUTEROL SULFATE 1 AMPULE: .5; 2.5 SOLUTION RESPIRATORY (INHALATION) at 16:14

## 2022-12-19 RX ADMIN — ENOXAPARIN SODIUM 30 MG: 100 INJECTION SUBCUTANEOUS at 20:13

## 2022-12-19 RX ADMIN — BUDESONIDE 500 MCG: 0.5 SUSPENSION RESPIRATORY (INHALATION) at 10:17

## 2022-12-19 ASSESSMENT — PAIN DESCRIPTION - LOCATION: LOCATION: HEAD

## 2022-12-19 ASSESSMENT — PAIN SCALES - GENERAL
PAINLEVEL_OUTOF10: 0
PAINLEVEL_OUTOF10: 0
PAINLEVEL_OUTOF10: 6

## 2022-12-19 ASSESSMENT — PAIN DESCRIPTION - DESCRIPTORS: DESCRIPTORS: ACHING;DISCOMFORT

## 2022-12-19 NOTE — PROGRESS NOTES
Occupational Therapy  OCCUPATIONAL THERAPY INITIAL EVALUATION      [address] [address] [address]       XXMS:                                                  Patient Name: Kalyani Romero  MRN: [id number]  : 1950  Room: [address] [place] [address]    Evaluating OT: Jeffery Mckenzie OTR/L #[id number]    Referring Provider[de-identified]  ANDREA Tim CNP    Specific Provider Orders/Date: OT evaluation and treatment 22    Diagnosis:  COPD exacerbation ([address] [J44.1]  Acute respiratory failure with hypoxia ([address] [J96.01]      Pertinent Medical History:  has a past medical history of Alcohol dependence ([address] Arthritis, Chronic low back pain, COPD (chronic obstructive pulmonary disease) ([address] Coronary arteriosclerosis, Depression, Hyperlipidemia, Hypertension, Osteomyelitis ([address] Pneumonia, Rhinitis, Spinal stenosis, lumbar, and Thyroid disease.        Precautions:  Fall Risk, O2, bed alarm,     Assessment of current deficits   [x] Functional mobility   [x]ADLs  [x] Strength               [x]Cognition   [x] Functional transfers   [] IADLs         [x] Safety Awareness   [x]Endurance   [] Fine Coordination              [x] Balance      [] Vision/perception   []Sensation    [x]Gross Motor Coordination  [] ROM  [] Delirium                   [] Motor Control     OT PLAN OF CARE   OT POC based on physician orders, patient diagnosis and results of clinical assessment    Frequency/Duration 1-3 days/wk for 2 weeks PRN   Specific OT Treatment to include:   * Instruction/training on adapted ADL techniques and AE recommendations to increase functional independence within precautions       * Training on energy conservation strategies, correct breathing pattern and techniques to improve independence/tolerance for self-care routine  * Functional transfer/mobility training/DME recommendations for increased independence, safety, and fall prevention  * Patient/Family education to increase follow through with safety techniques and functional independence  * Cognitive retraining/development of therapeutic activities to improve problem solving, judgement, memory, and attention for increased safety/participation in ADL/IADL tasks  * Therapeutic exercise to improve motor endurance, ROM, and functional strength for ADLs/functional transfers  * Therapeutic activities to facilitate/challenge dynamic balance, stand tolerance for increased safety and independence with ADLs  * Therapeutic activities to facilitate gross/fine motor skills for increased independence with ADLs  * Positioning to improve skin integrity, interaction with environment and functional independence      Recommended Adaptive Equipment:  TBD     Home Living:  Pt lives alone in a 1st floor apt with 3 step(s) to enter and 2 rail(s); Bathroom setup: walk in shower with shower chair, [address]   Equipment owned: shower chair, QC    Prior Level of Function:  Independent  with ADLs ,    Independent with IADLs. Ambulated with QC    Driving: ?? Pain Level: none    Cognition: A&O: 4/4;   Follows multi step directions: fair -   Memory:  fair    Sequencing:  poor   Problem solving:  poor   Judgement/safety:  poor    Functional Assessment:   AM-PAC Daily Activity Raw Score: 15/24     Initial Eval Status  Date: 12/19/22 Treatment Status  Date: STG=LTG  Time frame: 10-14 days   Feeding Stand by Assist   Independent    Grooming Minimal Assist   Modified [place]    UB Dressing Minimal Assist   Modified [place]    LB Dressing Moderate Assist   Modified [place]    Bathing Moderate Assist  For safety/balance   Limited activity tolerance  Supervision    Toileting Moderate Assist   Modified [place]    Bed Mobility  Supine to sit: NT   Sit to supine: Supervision   Supine to sit:  Independent   Sit to supine: Independent    Functional Transfers Sit to stand: Minimal Assist   Stand to sit: Minimal Assist   Stand pivot: Minimal Assist   Modified [place]    Functional Mobility Minimal Assist  with Quad cane, short distance  pt reaching for walls and demonstrating limited activity tolerance   Modified [place]  with AAD  Functional distance   Balance Sitting: Independent      Standing: Minimal Assist   Sitting: Independent       Standing: Modified [place]    Activity Tolerance Poor   On room air, SpO2 dropped to 77%-88% with activity. Fair +   Visual/  Perceptual Glasses: no  Perception: NT          BUE  ROM/Strength/  Fine motor Coordination Hand dominance: R    RUE: ROM grossly WFL     Strength: grossly 3+/5      Strength:  WFL     Coordination:  fair    LUE: ROM grossly WFL     Strength: grossly 3+/5      Strength:  WFL     Coordination: fair       Hearing: WFL   Sensation:  No c/o numbness or tingling   Tone:  WFL   Edema:  None noted    Comments:   RN cleared patient for OT. Upon arrival, patient was sitting EOB. Therapist facilitated and instructed pt on adapted  techniques & compensatory strategies to improve safety and independence with basic ADLs, functional transfers & functional mobility  to allow pt to achieve highest level of independence and safely. Patient presents with poor safety awareness, limited activity tolerance,   decreased  ADLs,   functional transfers, and  functional mobility . At end of session, patient in bed with call light and phone within reach, all lines and tubes intact. Pt would benefit from continued skilled OT to increase safety and independence with completion of ADL tasks and functional mobility for improved quality of life. Treatment: OT treatment provided this date includes:    ADL-  Instruction/training on safety and adapted techniques for completion of simulated bathing and dressing   Mobility-  Instruction/training on safety and improved independence with bed mobility , functional transfers  and functional mobility  Therapist facilitated and provided cues for body alignment and hand/feet placement. Skilled monitoring of O2 sats-  Pulse ox on Room Air Sitting _81_% , continued to drop to 77% with simulated bathing tasks while seated EOB. Pulse on 3 Liters Sitting recovery 100%   Pulse ox on room Air Ambulating ___80-91_%    Pulse ox on 3 Liters with < 1 min Recovery  100% at end of session. Rehab Potential: Good  for established goals     Patient / Family Goal: go home    Patient and/or family were instructed on functional diagnosis, prognosis/goals and OT plan of care. Demonstrated fair- understanding. Eval Complexity: Low    Time In: 3:40  Time Out: 4:10  Total Treatment Time: 15 minutes    Min Units   OT Eval Low 72052  x     OT Eval Medium 45834      OT Eval High 16373       OT Re-Eval [id number]       Therapeutic Ex 07503       Therapeutic Activities 47168  10 1   ADL/Self Care 04569  5    Orthotic Management 72283       Neuro Re-Ed 34646       Non-Billable Time          Evaluation Time includes thorough review of current medical information, gathering information on past medical history/social history and prior level of function, completion of standardized testing/informal observation of tasks, assessment of data and education on plan of care and goals.             [place], [place] #03801

## 2022-12-19 NOTE — PLAN OF CARE

## 2022-12-19 NOTE — PROGRESS NOTES
Hospitalist Progress Note      Synopsis:   Briefly, patient with PMH significant for alcohol dependence, chronic low back pain, COPD on 4 L nasal cannula chronically, coronary arterial sclerosis, depression, HPL, HPT, osteomyelitis, pneumonia, and thyroid disease presented to ED for shortness of breath. Allegedly home O2 was removed due to patient smoking and is shortness of breath has gotten increasing worse over the past 4 weeks. Laboratory studies in ED significant for proBNP 4760, troponin 79 with repeat 81 and hemoglobin of 9.9. Chest x-ray showed mild bibasilar infiltrates and interstitial markings. Patient transitioned to oral steroid. Patient has long well-documented history of noncompliance of [facility], patient's oxygen was taken from him due to this noncompliance. Patient currently admitted with COPD exacerbation with hypoxia and requiring 4 L nasal cannula. Case management working on getting patient requalified for oxygen. Hospital day 3     Subjective:  Stable overnight. No issues reported. Patient seen and examined  Records reviewed. Temp (24hrs), Av.4 °F (36.3 °C), Min:96.9 °F (36.1 °C), Max:97.8 °F (36.6 °C)    DIET: ADULT DIET; Regular; Low Sodium (2 gm)  CODE: Full Code    Intake/Output Summary (Last 24 hours) at 2022 1049  Last data filed at 2022 1013  Gross per 24 hour   Intake 240 ml   Output 800 ml   Net -560 ml       Review of Systems: All bolded are positive; please see HPI  General:  Fever, chills, diaphoresis, fatigue, malaise, night sweats, weight loss  Psychological:  Anxiety, disorientation, hallucinations. ENT:  Epistaxis, headaches, vertigo, visual changes. Cardiovascular:  Chest pain, irregular heartbeats, palpitations, paroxysmal nocturnal dyspnea. Respiratory:  Shortness of breath, coughing, sputum production, hemoptysis, wheezing, orthopnea.   Gastrointestinal:  Nausea, vomiting, diarrhea, heartburn, constipation, abdominal pain, hematemesis, hematochezia, melena, acholic stools  Genito-Urinary:  Dysuria, urgency, frequency, hematuria  Musculoskeletal:  Joint pain, joint stiffness, joint swelling, muscle pain  Neurology:  Headache, focal neurological deficits, weakness, numbness, paresthesia  Derm:  Rashes, ulcers, excoriations, bruising  Extremities:  Decreased ROM, peripheral edema, mottling    Objective:    /85   Pulse 54   Temp 96.9 °F (36.1 °C) (Temporal)   Resp 18   Ht 6' 0.05\" (1.83 m)   Wt 230 lb (104.3 kg)   SpO2 93%   BMI 31.15 kg/m²     General appearance: No apparent distress, appears stated age and cooperative. HEENT: Conjunctivae/corneas clear. Mucous membranes moist.  Neck: Supple. No JVD. Respiratory: Diminished to auscultation bilaterally. Normal respiratory effort. Cardiovascular:  RRR. S1, S2 without MRG. PV: Pulses palpable. No edema. Abdomen: Soft, non-tender, non-distended. +BS  Musculoskeletal: No obvious deformities. Skin: Normal skin color. No rashes or lesions. Good turgor. Neurologic:  Grossly non-focal. Awake, alert, following commands.    Psychiatric: Alert and oriented, thought content appropriate, normal insight and judgement    Medications:  REVIEWED DAILY    Infusion Medications    sodium chloride       Scheduled Medications    amitriptyline  10 mg Oral Nightly    calcium elemental  500 mg Oral Daily    clopidogrel  75 mg Oral Daily    furosemide  40 mg Oral BID    levothyroxine  125 mcg Oral Daily    metoprolol tartrate  50 mg Oral BID    pantoprazole  40 mg Oral QAM AC    pregabalin  50 mg Oral TID    vitamin E  400 Units Oral Daily    sodium chloride flush  5-40 mL IntraVENous 2 times per day    enoxaparin  30 mg SubCUTAneous BID    ipratropium-albuterol  1 ampule Inhalation Q4H WA    budesonide  500 mcg Nebulization BID    predniSONE  40 mg Oral Daily     PRN Meds: sodium chloride flush, sodium chloride, ondansetron **OR** ondansetron, polyethylene glycol, acetaminophen **OR** acetaminophen, albuterol    Labs:     Recent Labs     12/17/22  0533 12/18/22  0602 12/19/22  0614   WBC 7.5 14.3* 16.2*   HGB 13.2 12.4* 13.3   HCT 38.6 36.9* 40.4    267 251       Recent Labs     12/17/22  0533 12/18/22  0602 12/19/22  0614    137 138   K 4.7 4.6 4.7    103 102   CO2 22 22 25   BUN 12 25* 27*   CREATININE 0.7 0.9 0.8   CALCIUM 9.1 9.0 8.9       Recent Labs     12/17/22  0533 12/18/22  0602 12/19/22  0614   PROT 7.0 6.3* 6.5   ALKPHOS 102 96 93   ALT 16 15 17   AST 20 16 15   BILITOT 0.9 0.6 0.5       No results for input(s): INR in the last 72 hours. No results for input(s): [place] in the last 72 hours. Chronic labs:    Lab Results   Component Value Date    TSH 5.610 (H) 01/16/2018    INR 1.2 01/30/2018       Radiology: REVIEWED DAILY    Assessment:  Acute hypoxic respiratory failure likely secondary to COPD exacerbation  COPD exacerbation  Stage I diastolic heart failure, echo performed on 1/31/2018 showed normal left ventricle size and systolic function, stage I diastolic dysfunction, trace mild regurgitation and mild aortic stenosis  Hypertension  Hyperlipidemia  Anxiety/depression  Chronic anemia  Plan:  DuoNebs every 4 hours  Pulmicort twice daily  Albuterol as needed  Methylprednisone 40 mg IV every 6 hours  Supplemental oxygen to maintain SPO2 greater than 93%  Check ambulatory pulse ox  Continue home medications Lasix, Lopressor, Elavil, Synthroid, Plavix, Lyrica      DVT Prophylaxis [x] Lovenox  []  Heparin [] DOAC [] PCDs [] Ambulation    GI Prophylaxis [] PPI  [] H2 Blocker   [] Carafate  [x] Diet/Tube Feeds   Level of care [] Med/Surg  [x] Intermediate  []  ICU   Diet ADULT DIET; Regular;  Low Sodium (2 gm)    Family contact []  N/A    [] At bedside  [] Phone call   Disposition Patient requires continued admission evaluation and treatment of COPD exacerbation   MDM [] Low    [x] Moderate  []   High       Discharge Plan: Home when clinically stable    +++++++++++++++++++++++++++++++++++++++++++++++++  ANDREA Kolb Physician - [address] [place] [address]  +++++++++++++++++++++++++++++++++++++++++++++++++  NOTE: This report was transcribed using voice recognition software. Every effort was made to ensure accuracy; however, inadvertent computerized transcription errors may be present.

## 2022-12-19 NOTE — CARE COORDINATION
Attempted to speak with the patient at the bedside to discuss transition of care planning. Patient did say he came in from the [facility] on [address]. Patient refused to say who he sees at the [facility], just that he goes there. Patient stated he is service connected, but would not say what percentage of connection he has. When attempted to ask additional questions, patient closed his eyes and refused to speak further. Per nursing, patient was ambulating with a cane. Also reported per notes, patient had previously had oxygen through the [facility] but had recently been removed from the home as the patient was smoking with it on. Call placed to [facility], liaison with the [facility] home oxygen re: removal of oxygen. Per [facility]ON, oxygen had been removed because the patient has a very long and well documented history of smoking while having the oxygen on and in the home and of non-compliance with follow-up appointments in the office in [facility] with the Pulmonologist.  Last time he did go up to see them, he did not meet criteria for home oxygen. He is scheduled for a follow-up appointment with a new pulmonologist January 10th in [facility] at [facility] for another evaluation. [facility] did say if he meets criteria by ambulating POX, then he would need to agree to sign a new oxygen contract and they could possibly provide new home oxygen again for the patient after discharge from the hospital.  Call placed to WILLIS Gama NP with [facility] and reviewed above. Orders for ambulating POX and transfer to Med/Surg received. Call also placed to [facility] to notify of admission and detailed VM left with call back number if needed. Clinical information faxed to [phone]. Will continue to follow. Await ambulatory POX.       Madan Nguyen RN.  [facility]  [phone]

## 2022-12-19 NOTE — PROGRESS NOTES
Pulse ox on Room Air Sitting 96%   Pulse ox on room Air Ambulating 77%   Pulse ox on room air sitting recovery 88%  Pulse on 3Liters Sitting recovery 91%

## 2022-12-19 NOTE — PLAN OF CARE
Patient's chart updated to reflect:      . - HF care plan, HF education points and HF discharge instructions.  -Orders: 2 gram sodium diet, daily weights, I/O.  -PCP and/or Cardiologist appointment to be scheduled within 7 days of hospital discharge.  -History of HF, not primary admission Dx.   Patient admitted for treatment of ASSESSMENT:  -COPD exacerbation  -Hypertension  -Hyperlipidemia  -Anxiety/depression  -Chronic anemia  Maikol Yanez RN RN, BSN  Heart Failure Navigator

## 2022-12-20 ENCOUNTER — TELEPHONE (OUTPATIENT)
Dept: PULMONOLOGY | Age: 72
End: 2022-12-20

## 2022-12-20 VITALS
BODY MASS INDEX: 27.77 KG/M2 | WEIGHT: 205.03 LBS | DIASTOLIC BLOOD PRESSURE: 57 MMHG | TEMPERATURE: 97.2 F | RESPIRATION RATE: 16 BRPM | OXYGEN SATURATION: 98 % | SYSTOLIC BLOOD PRESSURE: 106 MMHG | HEIGHT: 72 IN | HEART RATE: 67 BPM

## 2022-12-20 LAB
ALBUMIN SERPL-MCNC: 3.5 G/DL (ref 3.5–5.2)
ALP BLD-CCNC: 84 U/L (ref 40–129)
ALT SERPL-CCNC: 16 U/L (ref 0–40)
ANION GAP SERPL CALCULATED.3IONS-SCNC: 10 MMOL/L (ref 7–16)
AST SERPL-CCNC: 14 U/L (ref 0–39)
BASOPHILS ABSOLUTE: 0.01 E9/L (ref 0–0.2)
BASOPHILS RELATIVE PERCENT: 0.1 % (ref 0–2)
BILIRUB SERPL-MCNC: 0.4 MG/DL (ref 0–1.2)
BUN BLDV-MCNC: 27 MG/DL (ref 6–23)
CALCIUM SERPL-MCNC: 8.6 MG/DL (ref 8.6–10.2)
CHLORIDE BLD-SCNC: 100 MMOL/L (ref 98–107)
CO2: 29 MMOL/L (ref 22–29)
CREAT SERPL-MCNC: 1 MG/DL (ref 0.7–1.2)
EOSINOPHILS ABSOLUTE: 0.01 E9/L (ref 0.05–0.5)
EOSINOPHILS RELATIVE PERCENT: 0.1 % (ref 0–6)
GFR SERPL CREATININE-BSD FRML MDRD: >60 ML/MIN/1.73
GLUCOSE BLD-MCNC: 89 MG/DL (ref 74–99)
HCT VFR BLD CALC: 38.6 % (ref 37–54)
HEMOGLOBIN: 12.6 G/DL (ref 12.5–16.5)
IMMATURE GRANULOCYTES #: 0.09 E9/L
IMMATURE GRANULOCYTES %: 0.7 % (ref 0–5)
LYMPHOCYTES ABSOLUTE: 1.57 E9/L (ref 1.5–4)
LYMPHOCYTES RELATIVE PERCENT: 12.6 % (ref 20–42)
MCH RBC QN AUTO: 31.9 PG (ref 26–35)
MCHC RBC AUTO-ENTMCNC: 32.6 % (ref 32–34.5)
MCV RBC AUTO: 97.7 FL (ref 80–99.9)
MONOCYTES ABSOLUTE: 1.18 E9/L (ref 0.1–0.95)
MONOCYTES RELATIVE PERCENT: 9.4 % (ref 2–12)
NEUTROPHILS ABSOLUTE: 9.63 E9/L (ref 1.8–7.3)
NEUTROPHILS RELATIVE PERCENT: 77.1 % (ref 43–80)
PDW BLD-RTO: 13.4 FL (ref 11.5–15)
PLATELET # BLD: 218 E9/L (ref 130–450)
PMV BLD AUTO: 10.9 FL (ref 7–12)
POTASSIUM REFLEX MAGNESIUM: 4 MMOL/L (ref 3.5–5)
RBC # BLD: 3.95 E12/L (ref 3.8–5.8)
SODIUM BLD-SCNC: 139 MMOL/L (ref 132–146)
TOTAL PROTEIN: 5.9 G/DL (ref 6.4–8.3)
WBC # BLD: 12.5 E9/L (ref 4.5–11.5)

## 2022-12-20 PROCEDURE — 2700000000 HC OXYGEN THERAPY PER DAY

## 2022-12-20 PROCEDURE — 94640 AIRWAY INHALATION TREATMENT: CPT

## 2022-12-20 PROCEDURE — 6370000000 HC RX 637 (ALT 250 FOR IP): Performed by: FAMILY MEDICINE

## 2022-12-20 PROCEDURE — 36415 COLL VENOUS BLD VENIPUNCTURE: CPT

## 2022-12-20 PROCEDURE — 80053 COMPREHEN METABOLIC PANEL: CPT

## 2022-12-20 PROCEDURE — 2580000003 HC RX 258: Performed by: FAMILY MEDICINE

## 2022-12-20 PROCEDURE — 85025 COMPLETE CBC W/AUTO DIFF WBC: CPT

## 2022-12-20 PROCEDURE — 97161 PT EVAL LOW COMPLEX 20 MIN: CPT

## 2022-12-20 PROCEDURE — 6360000002 HC RX W HCPCS: Performed by: FAMILY MEDICINE

## 2022-12-20 RX ORDER — PREDNISONE 20 MG/1
TABLET ORAL
Qty: 7 TABLET | Refills: 0 | Status: SHIPPED | OUTPATIENT
Start: 2022-12-21 | End: 2022-12-26

## 2022-12-20 RX ADMIN — CLOPIDOGREL BISULFATE 75 MG: 75 TABLET ORAL at 10:06

## 2022-12-20 RX ADMIN — BUDESONIDE 500 MCG: 0.5 SUSPENSION RESPIRATORY (INHALATION) at 09:12

## 2022-12-20 RX ADMIN — PANTOPRAZOLE SODIUM 40 MG: 40 TABLET, DELAYED RELEASE ORAL at 05:20

## 2022-12-20 RX ADMIN — SODIUM CHLORIDE, PRESERVATIVE FREE 10 ML: 5 INJECTION INTRAVENOUS at 10:06

## 2022-12-20 RX ADMIN — LEVOTHYROXINE SODIUM 125 MCG: 0.12 TABLET ORAL at 05:20

## 2022-12-20 RX ADMIN — IPRATROPIUM BROMIDE AND ALBUTEROL SULFATE 1 AMPULE: .5; 2.5 SOLUTION RESPIRATORY (INHALATION) at 13:51

## 2022-12-20 RX ADMIN — FUROSEMIDE 40 MG: 40 TABLET ORAL at 10:06

## 2022-12-20 RX ADMIN — PREGABALIN 50 MG: 50 CAPSULE ORAL at 15:15

## 2022-12-20 RX ADMIN — Medication 400 UNITS: at 10:05

## 2022-12-20 RX ADMIN — ENOXAPARIN SODIUM 30 MG: 100 INJECTION SUBCUTANEOUS at 10:06

## 2022-12-20 RX ADMIN — IPRATROPIUM BROMIDE AND ALBUTEROL SULFATE 1 AMPULE: .5; 2.5 SOLUTION RESPIRATORY (INHALATION) at 16:22

## 2022-12-20 RX ADMIN — IPRATROPIUM BROMIDE AND ALBUTEROL SULFATE 1 AMPULE: .5; 2.5 SOLUTION RESPIRATORY (INHALATION) at 09:11

## 2022-12-20 RX ADMIN — PREDNISONE 40 MG: 20 TABLET ORAL at 10:05

## 2022-12-20 RX ADMIN — CALCIUM 500 MG: 500 TABLET ORAL at 10:05

## 2022-12-20 RX ADMIN — PREGABALIN 50 MG: 50 CAPSULE ORAL at 10:05

## 2022-12-20 ASSESSMENT — PAIN SCALES - GENERAL: PAINLEVEL_OUTOF10: 0

## 2022-12-20 NOTE — DISCHARGE INSTRUCTIONS
You have an appointment at the [facility] with the Vlad Titus  February 15, 2023 at 11 am  This is a Mandatory appointment or your oxygen will be discontinued again. Please arrive 15 minutes early for your appointment. [facility] is your Oxygen Provider:  p:  [phone]. Oxygen Therapy: Care Instructions  Overview     Oxygen therapy helps you get more oxygen into your lungs and bloodstream. You may use it if you have a disease that makes it hard to breathe, such as COPD, pulmonary fibrosis (scarring of the lungs), or heart failure. Oxygen therapy can make it easier for you to breathe and can reduce your heart's workload. Some people need extra oxygen all the time. Others need it from time to time throughout the day or overnight. A doctor will prescribe how much oxygen you need and how often to use it. To breathe the oxygen, most people use a nasal cannula (say \"JOSUE-yuh-dimas\"). This is a thin tube with two prongs that fit just inside your nose. People who need a lot of oxygen may need to use a mask that fits over the nose and mouth. Follow-up care is a key part of your treatment and safety. Be sure to make and go to all appointments, and call your doctor if you are having problems. It's also a good idea to know your test results and keep a list of the medicines you take. How can you care for yourself at home? To help yourself  Using oxygen may dry out your nose or lips. Use water-based lubricants on your lips or nostrils. Do not use an oil-based product like petroleum jelly. They may cause skin burns. If you use a nasal cannula, the tubing may rub under your nostrils and around your ears. To keep your skin from getting sore, tuck some gauze under the tubing. Use a water-based lotion on rubbed areas. Do not use alcohol or take drugs that relax you, because they will slow your breathing rate.   Keep track of how much oxygen is in the tank, and reorder before it runs out. If a holiday is coming up or you expect bad weather, order in advance or make your regular order larger. You may need extra oxygen when you travel to high altitudes or travel by plane. Ask your doctor about this. If you are getting oxygen directly to your windpipe through an opening in your neck, your doctor will teach you how to care for the equipment. To make sure oxygen is flowing  Check the flow by holding your mask or cannula up to your ear and listening for the \"hiss\" of airflow. If you have a nasal cannula, dip the prongs in a glass of water. If you see bubbles, oxygen is coming through. Check your pressure gauge or contents indicator. If you use an oxygen concentrator, make sure it is turned on and plugged in. If you use a cylinder, make sure the valve is open. Look for kinks, blockages, or water in the tubing. Be sure the tubing is connected to the oxygen source. Do not change your oxygen flow rate. Your doctor sets this at the correct level. Higher flow rates usually do not help and can increase the risk of harmful carbon dioxide buildup in the blood. To be safe  Do not leave cords or tubing running across an area where you or someone else may trip on it. Do not let oxygen containers get hot. Store them in a cool place where there is airflow. Do not leave them in a car trunk or a hot vehicle. Keep oxygen containers upright. Make sure they do not fall over and get damaged. Try securing the tanks in a sturdy container or securing them with a rope or a chain. Avoid touching frost that can form on liquid oxygen devices. Neita Tara can cause skin burns. Watch for signs of oxygen leaks. If you hear a loud hissing from your container or if it empties too fast, stay away from the container. Open windows right away and call the company that brought the oxygen system to your home. Do not use oxygen around anything that could spark or easily cause a fire.   Do not smoke or vape or let others smoke or vape while you are using oxygen. Put up \"no smoking\" signs in your home. Do not use oxygen near open flames, such as candles, fireplaces, gas stoves, or hot water heaters. Do not use it near electric razors, hair dryers, heating pads, or anything that may spark. Keep a working fire extinguisher in your home where it is easy to get to. If a fire starts, turn off the oxygen right away and leave the house. If you have an oxygen concentrator, do not use it if the cord looks damaged. Do not use an extension cord to plug it in. Do not plug it into an outlet that has other appliances plugged into it. To care for the equipment  Follow the directions that come with the equipment for using and caring for it. Wash your cannula or mask with a liquid soap and warm water daily. Replace them every 2 to 4 weeks. If you have a cold, change the nasal prongs when your cold symptoms are done. If you have an oxygen concentrator, unplug the unit and wipe down the cabinet with a damp cloth daily. Clean the air filter at least once a week. Where can you learn more? Go to http://www.[employer]/ and enter E117 to learn more about \"Oxygen Therapy: Care Instructions. \"  Current as of: March 9, 2022               Content Version: 13.5  © 2388-5631 Healthwise, Incorporated. Care instructions adapted under license by [facility] ([facility]). If you have questions about a medical condition or this instruction, always ask your healthcare professional. [place] [address] any warranty or liability for your use of this information. COPD: Take This Chance to Quit Smoking (02:02)  Your health professional recommends that you watch this short online health video. Find motivation to quit by comparing what you tell yourself about smoking and what the facts are.   Purpose:  Challenges patients to think about ways they justify smoking despite its health risks and encourages them to try quitting. Goal:  The user will feel motivated to use the crisis of a new diagnosis, exacerbation, or ER visit to reevaluate quitting smoking. How to watch the video    Scan the QR code   OR Visit the website  https://hwi. se/r/Gkllbbqenzfwn   Current as of: March 9, 2022               Content Version: 13.5  © 2006-2022 [employer]. Care instructions adapted under license by [facility] ([facility]). If you have questions about a medical condition or this instruction, always ask your healthcare professional. [address] any warranty or liability for your use of this information. Learning About Benefits From Quitting Smoking  Why is it important to quit smoking? If you're thinking about quitting smoking, you may have a few reasons to be smoke-free. Your health may be one of them. When you quit smoking, you lower your risk for many serious health problems, such as cancer, lung disease, heart attack, stroke, blood vessel disease, and blindness from macular degeneration. When you're smoke-free, you get sick less often, and you heal faster. You are less likely to get colds, flu, bronchitis, and pneumonia. As a nonsmoker, you may find that your mood is better and you are less stressed. When and how will you feel healthier? Quitting has real health benefits that start from day 1 of being smoke-free. And the longer you stay smoke-free, the healthier you get and the better you feel. The first hours or days  Soon after you stop smoking, your blood pressure and heart rate go down. That means there's less stress on your heart and blood vessels. Within days, the level of carbon monoxide in your blood drops back to normal. That makes room for more oxygen. Within weeks or months  When your lungs begin to clear, you cough less and breathe deeper, so it may be easier to be active. Your sense of taste and smell should return.  That means you may enjoy food more than you have since you care is a key part of your treatment and safety. Be sure to make and go to all appointments, and call your doctor if you are having problems. It's also a good idea to know your test results and keep a list of the medicines you take. How can you care for yourself at home? Staying healthy    Do not smoke. This is the most important step you can take to prevent more damage to your lungs. If you need help quitting, talk to your doctor about stop-smoking programs and medicines. These can increase your chances of quitting for good. Avoid infections such as COVID-19, colds, and the flu. Wash your hands often. Get the pneumococcal and whooping cough (pertussis) vaccines. If you have had these vaccines before, ask your doctor if you need another dose. Get the flu vaccine every fall. Stay up to date on your COVID-19 vaccines. Avoid secondhand smoke and air pollution. Try to stay inside with your windows closed when air pollution is bad. Medicines and oxygen therapy    Take your medicines exactly as prescribed. Call your doctor if you think you are having a problem with your medicine. You may be taking medicines such as:  Bronchodilators. These help open your airways and make breathing easier. They are either short-acting (work for 4 to 9 hours) or long-acting (work for 12 to 24 hours). You inhale most bronchodilators, so they start to act quickly. Always carry your quick-relief inhaler with you in case you need it. Corticosteroids (prednisone, budesonide). These reduce airway inflammation. They come in inhaled or pill form. Ask your doctor or pharmacist if you are using each type of inhaler correctly. With correct use, the medicine is more likely to get to your lungs. See if a spacer is right for you. A spacer may also help you get more inhaled medicine to your lungs. If you use one, ask how to use it properly.      Do not take any vitamins, over-the-counter medicine, or herbal products without talking to your doctor first.     If your doctor prescribed antibiotics, take them as directed. Do not stop taking them just because you feel better. You need to take the full course of antibiotics. If you use oxygen therapy, use the flow rate your doctor has recommended. Don't change it without talking to your doctor first. Oxygen therapy boosts the amount of oxygen in your blood and helps you breathe easier. Activity    Get regular exercise. Walking is an easy way to get exercise. Start out slowly, and walk a little more each day. Pay attention to your breathing. You are exercising too hard if you can't talk while you exercise. Take short rest breaks when doing household chores and other activities. Learn breathing methods--such as breathing through pursed lips--to help you become less short of breath. If your doctor has not set you up with a pulmonary rehabilitation program, ask if rehab is right for you. Rehab includes exercise programs, education about your disease and how to manage it, help with diet and other changes, and emotional support. Diet    Eat regular, healthy meals. Use bronchodilators about 1 hour before you eat to make it easier to eat. Eat several smaller, frequent meals to prevent getting too full. A full stomach can push on the muscle that helps you breathe (your diaphragm) and make it harder to breathe. Drink beverages at the end of the meal.     Avoid foods that are hard to chew. Eat foods that contain protein so you don't lose muscle mass. Talk with your doctor if you gain too much weight or if you lose weight without trying. Mental health    Talk to your family, friends, or a therapist about your feelings. Some people feel frightened, angry, hopeless, helpless, and even guilty. Talking openly about feelings may help you cope. If these feelings last, talk to your doctor. When should you call for help?    Call 911 anytime you think you may need emergency care. For example, call if:    You have severe trouble breathing. You have severe chest pain. Call your doctor now or seek immediate medical care if:    You have new or worse trouble breathing. You have new or worse chest pain. You cough up blood. You have a fever. Watch closely for changes in your health, and be sure to contact your doctor if:    You cough more deeply or more often, especially if you notice more mucus or a change in the color of your mucus. You have new or worse swelling in your legs or belly. You have feelings of anxiety or depression. You need to use your antibiotic or steroid pills. You are not getting better as expected. Where can you learn more? Go to http://www.woods.com/ and enter X915 to learn more about \"Chronic Obstructive Pulmonary Disease (COPD): Care Instructions. \"  Current as of: March 9, 2022               Content Version: 13.5  © 7857-3088 Healthwise, Incorporated. Care instructions adapted under license by [facility] ([facility]). If you have questions about a medical condition or this instruction, always ask your healthcare professional. [address] any warranty or liability for your use of this information.

## 2022-12-20 NOTE — CARE COORDINATION
Chart reviewed and case reviewed with Dr Kindra Duncan and in [facility]. Patient is medically stable to discharge on PO prednisone if home oxygen can be arranged for Discharge. Met with the patient at the bedside and discussed the oxygen contract for transition of care. Patient stated that he quit smoking cold turkey 4-6 weeks ago, he cannot remember the exact date. Patient stated that he learned his lesson. [facility] oxygen contract completed and signed with the patient for transition of care. Clinical information and signed contract faxed to the [place] [facility] at [phone]. Call placed to [facility] at [phone] at ext [phone] and detailed VM left with the number for return call if they can provide O2 for discharge today. Await return call for transition of care. Will continue to follow. Placido Aggarwal RN.  Galileo Dill  [phone]      Spoke with [facility], liaison with [facility] [address] Oxygen program.  They have agreed to reissue the patient oxygen as he is in the hospital and qualifies at this time for oxygen again. New appointment made for follow-up in the office for February and all details placed on the patient's AVS.  Patient will need to be at follow-up appointment or his oxygen will be removed again. [employer] message sent to Dr Puma Holt, he will discharge. Spoke with the bedside nurse Richard Ladd, she will discharge when tank arrives. Patient does not have a ride home and no one to call. [facility] is a [facility]. Taxi voucher provided to nursing for patient to transition to home at discharge, [facility] per [facility]. Patient to discharge to home.       Placido Aggarwal RN.  Dennis Dill  [phone]

## 2022-12-20 NOTE — PROGRESS NOTES
Physical Therapy   Initial Assessment     Name: Adonis Dalal  : 1950  MRN: [id number]      Date of Service: 2022    Evaluating PT:  Lazarus Nguyen. Osmar Anthony, [id number]    Room #:  7007/1607-Y  Diagnosis:  COPD exacerbation ([facility] [J44.1]  Acute respiratory failure with hypoxia ([facility] [J96.01]  PMHx/PSHx:     has a past medical history of Alcohol dependence ([facility] Arthritis, Chronic low back pain, COPD (chronic obstructive pulmonary disease) ([facility] Coronary arteriosclerosis, Depression, Hyperlipidemia, Hypertension, Osteomyelitis ([facility] Pneumonia, Rhinitis, Spinal stenosis, lumbar, and Thyroid disease. has a past surgical history that includes Coronary artery bypass graft (). Precautions:  Fall risk, O2  Equipment needed: front wheeled walker    The pt would benefit from HHPT if the plan is to return home    SUBJECTIVE:    Pt lives alone in a 1 story home with 4 stairs to enter and 2 rails. Pt ambulated with [facility]. OBJECTIVE:   Initial Evaluation  Date: 22 Treatment Short Term/ Long Term   Goals   AM-PAC 6 Clicks      Was pt agreeable to Eval/treatment? Yes     Does pt have pain? Denied     Bed Mobility  Rolling: NT  Supine to sit: NT  Sit to supine: NT  Scooting: NT  Modified Independent    Transfers Sit to stand: SBA  Stand to sit: SBA  Stand pivot: SBA  Modified Independent   Ambulation    40 feet with SBQC with SBA  >150 feet with SBQC with Modified [place]   Stair negotiation: ascended and descended  NT  >4 steps with 2 rails with Modified [place]   BLE ROM WFL     BLE Strength Grossly 4/5     Balance Sitting: Independent  Standing: SBA  Independent     Pt is A & O x 4  Sensation:  Denied numbness/tingling  Edema:  None noted in BLE    Patient education  Pt educated on safety concerns, need for use of an AD, placing the cane on the floor with each step, line management.      Patient response to education:   Pt verbalized understanding Pt demonstrated skill Pt requires further education in this area   Yes Yes Reinforce     ASSESSMENT:    Conditions Requiring Skilled Therapeutic Intervention:    [x]Decreased strength     []Decreased ROM  [x]Decreased functional mobility  [x]Decreased balance   [x]Decreased endurance   []Decreased posture  []Decreased sensation  []Decreased coordination   []Decreased vision  [x]Decreased safety awareness   []Increased pain       Comments: The pt was seen sitting up at the EOB, was able to stand and ambulate a short distance unassisted, however the pt demonstrated significant safety concerns including poor O2 line management and reaching for the wall while ambulating. The pt stated he had no concerns and did not need a walker, prefers to use his cane. That is the pt's preference, will recommend a walker but the pt will likely decline. The pt required no physical assistance and had no LOB with ambulation into the hallway and back, he was left sitting up in a bedside chair with a call light in reach and all needs met. The pt is a high readmission risk, he lives alone, he is unsteady, and he uses O2 that presents an additional fall hazard. Treatment:  Patient practiced and was instructed in the following treatment:    Pt education: cues for proper use of AD and need for improved O2 line management. Pt's/ family goals   1. To return home    Prognosis is good for reaching above PT goals. Patient and or family understand(s) diagnosis, prognosis, and plan of care.   Yes    PHYSICAL THERAPY PLAN OF CARE:    PT POC is established based on physician order and patient diagnosis     Referring provider/PT Order:  ANDREA Paris CNP  Diagnosis:  COPD exacerbation ([facility] [J44.1]  Acute respiratory failure with hypoxia ([facility] [J96.01]  Specific instructions for next treatment:  Progress ambulation as able    Current Treatment Recommendations:     [x] Strengthening to improve independence with functional mobility   [] ROM to improve independence with functional mobility   [x] Balance Training to improve static/dynamic balance and to reduce fall risk  [x] Endurance Training to improve activity tolerance during functional mobility   [x] Transfer Training to improve safety and independence with all functional transfers   [x] Gait Training to improve gait mechanics, endurance and assess need for appropriate assistive device  [x] Stair Training in preparation for safe discharge home and/or into the community   [] Positioning to prevent skin breakdown and contractures  [x] Safety and Education Training   [] Patient/Caregiver Education   [] HEP  [] Other     PT long term treatment goals are located in above grid    Frequency of treatments: 2-5x/week x 1-2 weeks. Time in  1430  Time out  1443    Total Treatment Time  0 minutes     Evaluation Time includes thorough review of current medical information, gathering information on past medical history/social history and prior level of function, completion of standardized testing/informal observation of tasks, assessment of data and education on plan of care and goals. CPT codes:  [x] Low Complexity PT evaluation 90378  [] Moderate Complexity PT evaluation 96684  [] High Complexity PT evaluation 76239  [] PT Re-evaluation 10501  [] Gait training 46018 0 minutes  [] Manual therapy 28153 0 minutes  [x] Therapeutic activities 74259 0 minutes  [] Therapeutic exercises 18424 0 minutes  [] Neuromuscular reeducation 72205 0 minutes     Anatsasiya Garcia  [place], [place]  [id number]

## 2022-12-20 NOTE — PROGRESS NOTES
Hospitalist Progress Note      PCP: Desi Contreras MD    Date of Admission: 12/16/2022  Days in the hospital: [address] Course:   patient with PMH significant for alcohol dependence, chronic low back pain, COPD on 4 L nasal cannula chronically, coronary arterial sclerosis, depression, HPL, HPT, osteomyelitis, pneumonia, and thyroid disease presented to ED for shortness of breath. Allegedly home O2 was removed due to patient smoking and is shortness of breath has gotten increasing worse over the past 4 weeks. Laboratory studies in ED significant for proBNP 4760, troponin 79 with repeat 81 and hemoglobin of 9.9. Chest x-ray showed mild bibasilar infiltrates and interstitial markings. Patient transitioned to oral steroid. Patient has long well-documented history of noncompliance of [facility], patient's oxygen was taken from him due to this noncompliance. Patient currently admitted with COPD exacerbation with hypoxia and requiring 4 L nasal cannula. Initially he was placed on IV steroids and later transitioned to prednisone. Case management working on getting patient requalified for oxygen. Subjective  Patient seen and examined at bedside. Currently on 3 to 4 L oxygen. Home O2 being arranged. Discussed with case management. Exam:    BP (!) 106/57   Pulse (!) 47   Temp 97.2 °F (36.2 °C) (Temporal)   Resp 18   Ht 6' 0.05\" (1.83 m)   Wt 205 lb 0.4 oz (93 kg)   SpO2 93%   BMI 27.77 kg/m²     HEENT: No pallor, no icterus. Respiratory:  CTA, decreased air entry. Cardiovascular: RRR, no murmur. Abdomen: Soft, non-tender, BS noted. Musculoskeletal: No joint pains or joint swelling noted. Neurologic: awake, alert and following commands       Assessment/Plan:  Acute hypoxic respiratory failure likely secondary to COPD exacerbation, currently on 3 to 4 L oxygen, continue to titrate as tolerated, being evaluated for home O2.   COPD exacerbation, continue prednisone, aerosols  Stage I diastolic heart failure, echo performed on 1/31/2018 showed normal left ventricle size and systolic function, stage I diastolic dysfunction, trace mild regurgitation and mild aortic stenosis, continue Lasix as ordered  Hypertension, blood pressure controlled  Hyperlipidemia, on statin  Hypothyroidism, on Synthroid      Labs:   Recent Labs     12/18/22  0602 12/19/22  0614 12/20/22  0643   WBC 14.3* 16.2* 12.5*   HGB 12.4* 13.3 12.6   HCT 36.9* 40.4 38.6    251 218     Recent Labs     12/18/22  0602 12/19/22  0614 12/20/22  0643    138 139   K 4.6 4.7 4.0    102 100   CO2 22 25 29   BUN 25* 27* 27*   CREATININE 0.9 0.8 1.0   CALCIUM 9.0 8.9 8.6     Recent Labs     12/18/22  0602 12/19/22  0614 12/20/22  0643   AST 16 15 14   ALT 15 17 16   BILITOT 0.6 0.5 0.4   ALKPHOS 96 93 84     No results for input(s): INR in the last 72 hours. No results for input(s): Cleatrice Pedroza in the last 72 hours.     Medications:  Reviewed    Infusion Medications    sodium chloride       Scheduled Medications    amitriptyline  10 mg Oral Nightly    calcium elemental  500 mg Oral Daily    clopidogrel  75 mg Oral Daily    furosemide  40 mg Oral BID    levothyroxine  125 mcg Oral Daily    metoprolol tartrate  50 mg Oral BID    pantoprazole  40 mg Oral QAM AC    pregabalin  50 mg Oral TID    vitamin E  400 Units Oral Daily    sodium chloride flush  5-40 mL IntraVENous 2 times per day    enoxaparin  30 mg SubCUTAneous BID    ipratropium-albuterol  1 ampule Inhalation Q4H WA    budesonide  500 mcg Nebulization BID    predniSONE  40 mg Oral Daily     PRN Meds: sodium chloride flush, sodium chloride, ondansetron **OR** ondansetron, polyethylene glycol, acetaminophen **OR** acetaminophen, albuterol      Intake/Output Summary (Last 24 hours) at 12/20/2022 1039  Last data filed at 12/20/2022 0731  Gross per 24 hour   Intake 480 ml   Output 1775 ml   Net -1295 ml     Body mass index is 27.77 kg/m². Diet  ADULT DIET; Regular; Low Sodium (2 gm)    Code Status  Full Code       Electronically signed by Lyubov Hickey MD on 12/20/2022 at 10:39 AM  [employer]   Please contact me through perfect serve    NOTE: This report was transcribed using voice recognition software. Every effort was made to ensure accuracy; however, inadvertent computerized transcription errors may be present.

## 2022-12-20 NOTE — DISCHARGE SUMMARY
Hospital Medicine Discharge Summary    Patient ID: Maryanne Fraire      Patient's PCP: Sergio Mclean MD    Admit Date: 12/16/2022     Discharge Date: 12/20/2022      Admitting Physician: Eliu Pate DO     Discharge Physician: Enoc Thomas MD      Condition at discharge: Stable    Disposition: Home    Discharge Diagnoses:  Acute hypoxic respiratory failure  COPD exacerbation  Stage I diastolic heart failure  Hypertension  Dyslipidemia  Hypothyroidism     The patient was seen and examined on day of discharge and this discharge summary is in conjunction with any daily progress note from day of discharge. Hospital Course:   Mr. Maryanne Fraire, a 71y.o. year old male  patient with PMH significant for alcohol dependence, chronic low back pain, COPD on 4 L nasal cannula chronically, coronary arterial sclerosis, depression, HPL, HPT, osteomyelitis, pneumonia, and thyroid disease presented to ED for shortness of breath. Allegedly home O2 was removed due to patient smoking and is shortness of breath has gotten increasing worse over the past 4 weeks. Laboratory studies in ED significant for proBNP 4760, troponin 79 with repeat 81 and hemoglobin of 9.9. Chest x-ray showed mild bibasilar infiltrates and interstitial markings. Patient transitioned to oral steroid. Patient has long well-documented history of noncompliance of [facility], patient's oxygen was taken from him due to this noncompliance. Patient currently admitted with COPD exacerbation with hypoxia and requiring 4 L nasal cannula. Initially he was placed on IV steroids and later transitioned to prednisone. Patient was approved for home O2 and plan is for discharge today. Please review progress note from earlier today for further details. He will be placed on tapering dose of steroids.   Consults:     IP CONSULT TO INTERNAL MEDICINE    Significant Diagnostic Studies:  As above      Discharge Instructions/Follow-up:  As above       Activity: activity as tolerated      Labs: For convenience and continuity at follow-up the following most recent labs are provided:      CBC:    Lab Results   Component Value Date/Time    WBC 12.5 12/20/2022 06:43 AM    HGB 12.6 12/20/2022 06:43 AM    HCT 38.6 12/20/2022 06:43 AM     12/20/2022 06:43 AM       Renal:    Lab Results   Component Value Date/Time     12/20/2022 06:43 AM    K 4.0 12/20/2022 06:43 AM     12/20/2022 06:43 AM    CO2 29 12/20/2022 06:43 AM    BUN 27 12/20/2022 06:43 AM    CREATININE 1.0 12/20/2022 06:43 AM    CALCIUM 8.6 12/20/2022 06:43 AM    PHOS 2.6 02/06/2018 07:46 AM         Discharge Medications:     Current Discharge Medication List             Details   predniSONE (DELTASONE) 20 MG tablet Take 2 tablets by mouth daily for 2 days, THEN 1 tablet daily for 3 days. Qty: 7 tablet, Refills: 0                Details   budesonide-formoterol (SYMBICORT) 160-4.5 MCG/ACT AERO Inhale 2 puffs into the lungs 2 times daily  Qty: 3 Inhaler, Refills: 3    Associated Diagnoses: Stage 4 very severe COPD by GOLD classification ([address] Pulmonary emphysema with fibrosis of lung (HCC)      diclofenac sodium 1 % GEL Apply 2 g topically 2 times daily      BUDESONIDE PO Take by mouth      miconazole (MICOTIN) 2 % cream Apply topically 2 times daily Apply topically 2 times daily. Rosuvastatin Calcium 40 MG CPSP Take by mouth      metoprolol tartrate (LOPRESSOR) 50 MG tablet Take 50 mg by mouth 2 times daily      tiotropium (SPIRIVA) 18 MCG inhalation capsule Inhale 2.5 mcg into the lungs daily      albuterol (ACCUNEB) 0.63 MG/3ML nebulizer solution Take 1 ampule by nebulization every 6 hours as needed for Wheezing      pregabalin (LYRICA) 50 MG capsule Take 50 mg by mouth 3 times daily.       pantoprazole (PROTONIX) 40 MG tablet Take 40 mg by mouth daily      Omega-3 Fatty Acids (FISH OIL) 500 MG CAPS Take by mouth      raNITIdine HCl (RANITIDINE 150 MAX STRENGTH PO) Take 300 mg by mouth      furosemide (LASIX) 40 MG tablet Take 40 mg by mouth 2 times daily      traMADol (ULTRAM) 50 MG tablet Take 50 mg by mouth every 6 hours as needed for Pain. clopidogrel (PLAVIX) 75 MG tablet Take 75 mg by mouth daily      levothyroxine (SYNTHROID) 125 MCG tablet Take 125 mcg by mouth Daily      amitriptyline (ELAVIL) 10 MG tablet Take 10 mg by mouth nightly      vitamin E 400 UNIT capsule Take 400 Units by mouth daily      calcium carbonate (OSCAL) 500 MG TABS tablet Take 500 mg by mouth daily      !! Multiple Vitamins-Minerals (MULTIVITAL-M PO) Take by mouth      !! LUTEIN-ZEAXANTHIN PO Take 1 tablet by mouth      OXYGEN Inhale into the lungs       !! - Potential duplicate medications found. Please discuss with provider. Time Spent on discharge is more than 31 min in the examination, evaluation, counseling and review of medications and discharge plan. Signed:    Aleida Gutierrez MD   12/20/2022      Thank you Arlette Knapp MD for the opportunity to be involved in this patient's care. If you have any questions or concerns please feel free to contact me. NOTE: This report was transcribed using voice recognition software. Every effort was made to ensure accuracy; however, inadvertent computerized transcription errors may be present.

## 2023-01-08 ENCOUNTER — APPOINTMENT (OUTPATIENT)
Dept: GENERAL RADIOLOGY | Age: 73
End: 2023-01-08
Payer: OTHER GOVERNMENT

## 2023-01-08 ENCOUNTER — APPOINTMENT (OUTPATIENT)
Dept: CT IMAGING | Age: 73
End: 2023-01-08
Payer: OTHER GOVERNMENT

## 2023-01-08 ENCOUNTER — TELEPHONE (OUTPATIENT)
Dept: OTHER | Facility: CLINIC | Age: 73
End: 2023-01-08

## 2023-01-08 ENCOUNTER — HOSPITAL ENCOUNTER (INPATIENT)
Age: 73
LOS: 4 days | Discharge: NURSING, REHAB OR LONG-TERM CARE | End: 2023-01-12
Attending: EMERGENCY MEDICINE | Admitting: INTERNAL MEDICINE
Payer: OTHER GOVERNMENT

## 2023-01-08 DIAGNOSIS — J96.11 CHRONIC RESPIRATORY FAILURE WITH HYPOXIA (HCC): ICD-10-CM

## 2023-01-08 DIAGNOSIS — J44.1 CHRONIC OBSTRUCTIVE PULMONARY DISEASE WITH ACUTE EXACERBATION (HCC): ICD-10-CM

## 2023-01-08 DIAGNOSIS — R41.82 ALTERED MENTAL STATUS, UNSPECIFIED ALTERED MENTAL STATUS TYPE: ICD-10-CM

## 2023-01-08 DIAGNOSIS — R77.8 ELEVATED TROPONIN: ICD-10-CM

## 2023-01-08 DIAGNOSIS — R07.9 CHEST PAIN, UNSPECIFIED TYPE: Primary | ICD-10-CM

## 2023-01-08 LAB
ACETAMINOPHEN LEVEL: <5 MCG/ML (ref 10–30)
ALBUMIN SERPL-MCNC: 3.6 G/DL (ref 3.5–5.2)
ALP BLD-CCNC: 126 U/L (ref 40–129)
ALT SERPL-CCNC: 8 U/L (ref 0–40)
AMPHETAMINE SCREEN, URINE: NOT DETECTED
ANION GAP SERPL CALCULATED.3IONS-SCNC: 11 MMOL/L (ref 7–16)
AST SERPL-CCNC: 13 U/L (ref 0–39)
B.E.: 1.7 MMOL/L (ref -3–3)
BACTERIA: NORMAL /HPF
BARBITURATE SCREEN URINE: NOT DETECTED
BASOPHILS ABSOLUTE: 0.08 E9/L (ref 0–0.2)
BASOPHILS RELATIVE PERCENT: 1 % (ref 0–2)
BENZODIAZEPINE SCREEN, URINE: NOT DETECTED
BILIRUB SERPL-MCNC: 0.6 MG/DL (ref 0–1.2)
BILIRUBIN URINE: NEGATIVE
BLOOD, URINE: NEGATIVE
BUN BLDV-MCNC: 8 MG/DL (ref 6–23)
CALCIUM SERPL-MCNC: 9.2 MG/DL (ref 8.6–10.2)
CANNABINOID SCREEN URINE: NOT DETECTED
CHLORIDE BLD-SCNC: 102 MMOL/L (ref 98–107)
CLARITY: CLEAR
CO2: 26 MMOL/L (ref 22–29)
COCAINE METABOLITE SCREEN URINE: NOT DETECTED
COHB: 0.6 % (ref 0–1.5)
COLOR: YELLOW
CREAT SERPL-MCNC: 1.2 MG/DL (ref 0.7–1.2)
CRITICAL: ABNORMAL
DATE ANALYZED: ABNORMAL
DATE OF COLLECTION: ABNORMAL
EOSINOPHILS ABSOLUTE: 0.28 E9/L (ref 0.05–0.5)
EOSINOPHILS RELATIVE PERCENT: 3.7 % (ref 0–6)
ETHANOL: <10 MG/DL (ref 0–0.08)
FENTANYL SCREEN, URINE: NOT DETECTED
GFR SERPL CREATININE-BSD FRML MDRD: >60 ML/MIN/1.73
GLUCOSE BLD-MCNC: 87 MG/DL (ref 74–99)
GLUCOSE URINE: NEGATIVE MG/DL
HCO3: 25.2 MMOL/L (ref 22–26)
HCT VFR BLD CALC: 38.7 % (ref 37–54)
HEMOGLOBIN: 12.6 G/DL (ref 12.5–16.5)
HHB: 1.4 % (ref 0–5)
IMMATURE GRANULOCYTES #: 0.04 E9/L
IMMATURE GRANULOCYTES %: 0.5 % (ref 0–5)
INFLUENZA A BY PCR: NOT DETECTED
INFLUENZA B BY PCR: NOT DETECTED
KETONES, URINE: NEGATIVE MG/DL
LAB: ABNORMAL
LEUKOCYTE ESTERASE, URINE: ABNORMAL
LYMPHOCYTES ABSOLUTE: 1 E9/L (ref 1.5–4)
LYMPHOCYTES RELATIVE PERCENT: 13.1 % (ref 20–42)
Lab: ABNORMAL
Lab: NORMAL
MAGNESIUM: 1.9 MG/DL (ref 1.6–2.6)
MCH RBC QN AUTO: 31.3 PG (ref 26–35)
MCHC RBC AUTO-ENTMCNC: 32.6 % (ref 32–34.5)
MCV RBC AUTO: 96 FL (ref 80–99.9)
METER GLUCOSE: 70 MG/DL (ref 74–99)
METHADONE SCREEN, URINE: NOT DETECTED
METHB: 0.2 % (ref 0–1.5)
MODE: ABNORMAL
MONOCYTES ABSOLUTE: 0.63 E9/L (ref 0.1–0.95)
MONOCYTES RELATIVE PERCENT: 8.2 % (ref 2–12)
NEUTROPHILS ABSOLUTE: 5.62 E9/L (ref 1.8–7.3)
NEUTROPHILS RELATIVE PERCENT: 73.5 % (ref 43–80)
NITRITE, URINE: NEGATIVE
O2 CONTENT: 17.2 ML/DL
O2 SATURATION: 98.6 % (ref 92–98.5)
O2HB: 97.8 % (ref 94–97)
OPERATOR ID: 1893
OPIATE SCREEN URINE: NOT DETECTED
OXYCODONE URINE: NOT DETECTED
PATIENT TEMP: 37 C
PCO2: 35.6 MMHG (ref 35–45)
PDW BLD-RTO: 13.6 FL (ref 11.5–15)
PH BLOOD GAS: 7.47 (ref 7.35–7.45)
PH UA: 6 (ref 5–9)
PHENCYCLIDINE SCREEN URINE: NOT DETECTED
PLATELET # BLD: 249 E9/L (ref 130–450)
PMV BLD AUTO: 10.1 FL (ref 7–12)
PO2: 142.7 MMHG (ref 75–100)
POTASSIUM SERPL-SCNC: 3.2 MMOL/L (ref 3.5–5)
PRO-BNP: 3835 PG/ML (ref 0–125)
PROTEIN UA: NEGATIVE MG/DL
RBC # BLD: 4.03 E12/L (ref 3.8–5.8)
RBC UA: NORMAL /HPF (ref 0–2)
SALICYLATE, SERUM: <0.3 MG/DL (ref 0–30)
SARS-COV-2, NAAT: NOT DETECTED
SODIUM BLD-SCNC: 139 MMOL/L (ref 132–146)
SOURCE, BLOOD GAS: ABNORMAL
SPECIFIC GRAVITY UA: <=1.005 (ref 1–1.03)
THB: 12.3 G/DL (ref 11.5–16.5)
TIME ANALYZED: 1154
TOTAL PROTEIN: 6.8 G/DL (ref 6.4–8.3)
TRICYCLIC ANTIDEPRESSANTS SCREEN SERUM: NEGATIVE NG/ML
TROPONIN, HIGH SENSITIVITY: 100 NG/L (ref 0–11)
TROPONIN, HIGH SENSITIVITY: 85 NG/L (ref 0–11)
UROBILINOGEN, URINE: 0.2 E.U./DL
WBC # BLD: 7.7 E9/L (ref 4.5–11.5)
WBC UA: NORMAL /HPF (ref 0–5)

## 2023-01-08 PROCEDURE — 6370000000 HC RX 637 (ALT 250 FOR IP): Performed by: STUDENT IN AN ORGANIZED HEALTH CARE EDUCATION/TRAINING PROGRAM

## 2023-01-08 PROCEDURE — 80143 DRUG ASSAY ACETAMINOPHEN: CPT

## 2023-01-08 PROCEDURE — 82805 BLOOD GASES W/O2 SATURATION: CPT

## 2023-01-08 PROCEDURE — 2580000003 HC RX 258: Performed by: INTERNAL MEDICINE

## 2023-01-08 PROCEDURE — 87635 SARS-COV-2 COVID-19 AMP PRB: CPT

## 2023-01-08 PROCEDURE — 83880 ASSAY OF NATRIURETIC PEPTIDE: CPT

## 2023-01-08 PROCEDURE — 84484 ASSAY OF TROPONIN QUANT: CPT

## 2023-01-08 PROCEDURE — 93005 ELECTROCARDIOGRAM TRACING: CPT | Performed by: NURSE PRACTITIONER

## 2023-01-08 PROCEDURE — 82077 ASSAY SPEC XCP UR&BREATH IA: CPT

## 2023-01-08 PROCEDURE — 96374 THER/PROPH/DIAG INJ IV PUSH: CPT

## 2023-01-08 PROCEDURE — 6360000002 HC RX W HCPCS: Performed by: INTERNAL MEDICINE

## 2023-01-08 PROCEDURE — 81001 URINALYSIS AUTO W/SCOPE: CPT

## 2023-01-08 PROCEDURE — 71046 X-RAY EXAM CHEST 2 VIEWS: CPT

## 2023-01-08 PROCEDURE — 82962 GLUCOSE BLOOD TEST: CPT

## 2023-01-08 PROCEDURE — 6370000000 HC RX 637 (ALT 250 FOR IP): Performed by: INTERNAL MEDICINE

## 2023-01-08 PROCEDURE — 70450 CT HEAD/BRAIN W/O DYE: CPT

## 2023-01-08 PROCEDURE — 2580000003 HC RX 258: Performed by: RADIOLOGY

## 2023-01-08 PROCEDURE — 87449 NOS EACH ORGANISM AG IA: CPT

## 2023-01-08 PROCEDURE — 85025 COMPLETE CBC W/AUTO DIFF WBC: CPT

## 2023-01-08 PROCEDURE — 80307 DRUG TEST PRSMV CHEM ANLYZR: CPT

## 2023-01-08 PROCEDURE — 96372 THER/PROPH/DIAG INJ SC/IM: CPT

## 2023-01-08 PROCEDURE — 80179 DRUG ASSAY SALICYLATE: CPT

## 2023-01-08 PROCEDURE — 36415 COLL VENOUS BLD VENIPUNCTURE: CPT

## 2023-01-08 PROCEDURE — 71275 CT ANGIOGRAPHY CHEST: CPT

## 2023-01-08 PROCEDURE — 87502 INFLUENZA DNA AMP PROBE: CPT

## 2023-01-08 PROCEDURE — 80053 COMPREHEN METABOLIC PANEL: CPT

## 2023-01-08 PROCEDURE — 83735 ASSAY OF MAGNESIUM: CPT

## 2023-01-08 PROCEDURE — 6360000002 HC RX W HCPCS: Performed by: STUDENT IN AN ORGANIZED HEALTH CARE EDUCATION/TRAINING PROGRAM

## 2023-01-08 PROCEDURE — 2060000000 HC ICU INTERMEDIATE R&B

## 2023-01-08 PROCEDURE — 94664 DEMO&/EVAL PT USE INHALER: CPT

## 2023-01-08 PROCEDURE — 94640 AIRWAY INHALATION TREATMENT: CPT

## 2023-01-08 PROCEDURE — 99285 EMERGENCY DEPT VISIT HI MDM: CPT

## 2023-01-08 PROCEDURE — 6360000004 HC RX CONTRAST MEDICATION: Performed by: RADIOLOGY

## 2023-01-08 RX ORDER — METHYLPREDNISOLONE SODIUM SUCCINATE 40 MG/ML
40 INJECTION, POWDER, LYOPHILIZED, FOR SOLUTION INTRAMUSCULAR; INTRAVENOUS EVERY 12 HOURS
Status: DISCONTINUED | OUTPATIENT
Start: 2023-01-09 | End: 2023-01-10

## 2023-01-08 RX ORDER — POLYETHYLENE GLYCOL 3350 17 G/17G
17 POWDER, FOR SOLUTION ORAL DAILY PRN
Status: DISCONTINUED | OUTPATIENT
Start: 2023-01-08 | End: 2023-01-12 | Stop reason: HOSPADM

## 2023-01-08 RX ORDER — ONDANSETRON 4 MG/1
4 TABLET, ORALLY DISINTEGRATING ORAL EVERY 8 HOURS PRN
Status: DISCONTINUED | OUTPATIENT
Start: 2023-01-08 | End: 2023-01-12 | Stop reason: HOSPADM

## 2023-01-08 RX ORDER — SODIUM CHLORIDE 0.9 % (FLUSH) 0.9 %
10 SYRINGE (ML) INJECTION ONCE
Status: COMPLETED | OUTPATIENT
Start: 2023-01-08 | End: 2023-01-08

## 2023-01-08 RX ORDER — HYDRALAZINE HYDROCHLORIDE 20 MG/ML
10 INJECTION INTRAMUSCULAR; INTRAVENOUS EVERY 6 HOURS PRN
Status: DISCONTINUED | OUTPATIENT
Start: 2023-01-08 | End: 2023-01-12 | Stop reason: HOSPADM

## 2023-01-08 RX ORDER — SODIUM CHLORIDE 9 MG/ML
INJECTION, SOLUTION INTRAVENOUS PRN
Status: DISCONTINUED | OUTPATIENT
Start: 2023-01-08 | End: 2023-01-12 | Stop reason: HOSPADM

## 2023-01-08 RX ORDER — ENOXAPARIN SODIUM 100 MG/ML
40 INJECTION SUBCUTANEOUS DAILY
Status: DISCONTINUED | OUTPATIENT
Start: 2023-01-08 | End: 2023-01-12 | Stop reason: HOSPADM

## 2023-01-08 RX ORDER — CALCIUM CARBONATE 500(1250)
500 TABLET ORAL DAILY
Status: DISCONTINUED | OUTPATIENT
Start: 2023-01-09 | End: 2023-01-12 | Stop reason: HOSPADM

## 2023-01-08 RX ORDER — AMITRIPTYLINE HYDROCHLORIDE 10 MG/1
10 TABLET, FILM COATED ORAL NIGHTLY
Status: DISCONTINUED | OUTPATIENT
Start: 2023-01-08 | End: 2023-01-08

## 2023-01-08 RX ORDER — METHYLPREDNISOLONE SODIUM SUCCINATE 125 MG/2ML
125 INJECTION, POWDER, LYOPHILIZED, FOR SOLUTION INTRAMUSCULAR; INTRAVENOUS ONCE
Status: COMPLETED | OUTPATIENT
Start: 2023-01-08 | End: 2023-01-08

## 2023-01-08 RX ORDER — TRAMADOL HYDROCHLORIDE 50 MG/1
50 TABLET ORAL EVERY 6 HOURS PRN
Status: DISCONTINUED | OUTPATIENT
Start: 2023-01-08 | End: 2023-01-12 | Stop reason: HOSPADM

## 2023-01-08 RX ORDER — PANTOPRAZOLE SODIUM 40 MG/1
40 TABLET, DELAYED RELEASE ORAL DAILY
Status: DISCONTINUED | OUTPATIENT
Start: 2023-01-09 | End: 2023-01-12 | Stop reason: HOSPADM

## 2023-01-08 RX ORDER — SODIUM CHLORIDE 0.9 % (FLUSH) 0.9 %
5-40 SYRINGE (ML) INJECTION EVERY 12 HOURS SCHEDULED
Status: DISCONTINUED | OUTPATIENT
Start: 2023-01-08 | End: 2023-01-12 | Stop reason: HOSPADM

## 2023-01-08 RX ORDER — ARFORMOTEROL TARTRATE 15 UG/2ML
15 SOLUTION RESPIRATORY (INHALATION) 2 TIMES DAILY
Status: DISCONTINUED | OUTPATIENT
Start: 2023-01-08 | End: 2023-01-12 | Stop reason: HOSPADM

## 2023-01-08 RX ORDER — IPRATROPIUM BROMIDE AND ALBUTEROL SULFATE 2.5; .5 MG/3ML; MG/3ML
1 SOLUTION RESPIRATORY (INHALATION)
Status: DISCONTINUED | OUTPATIENT
Start: 2023-01-08 | End: 2023-01-12 | Stop reason: HOSPADM

## 2023-01-08 RX ORDER — ACETAMINOPHEN 325 MG/1
650 TABLET ORAL EVERY 6 HOURS PRN
Status: DISCONTINUED | OUTPATIENT
Start: 2023-01-08 | End: 2023-01-12 | Stop reason: HOSPADM

## 2023-01-08 RX ORDER — CALCIUM CARBONATE 200(500)MG
500 TABLET,CHEWABLE ORAL 3 TIMES DAILY PRN
Status: DISCONTINUED | OUTPATIENT
Start: 2023-01-08 | End: 2023-01-12 | Stop reason: HOSPADM

## 2023-01-08 RX ORDER — SODIUM CHLORIDE 0.9 % (FLUSH) 0.9 %
5-40 SYRINGE (ML) INJECTION PRN
Status: DISCONTINUED | OUTPATIENT
Start: 2023-01-08 | End: 2023-01-12 | Stop reason: HOSPADM

## 2023-01-08 RX ORDER — LANOLIN ALCOHOL/MO/W.PET/CERES
3 CREAM (GRAM) TOPICAL NIGHTLY PRN
Status: DISCONTINUED | OUTPATIENT
Start: 2023-01-08 | End: 2023-01-12 | Stop reason: HOSPADM

## 2023-01-08 RX ORDER — METOPROLOL TARTRATE 50 MG/1
50 TABLET, FILM COATED ORAL 2 TIMES DAILY
Status: DISCONTINUED | OUTPATIENT
Start: 2023-01-08 | End: 2023-01-10

## 2023-01-08 RX ORDER — ONDANSETRON 2 MG/ML
4 INJECTION INTRAMUSCULAR; INTRAVENOUS EVERY 6 HOURS PRN
Status: DISCONTINUED | OUTPATIENT
Start: 2023-01-08 | End: 2023-01-12 | Stop reason: HOSPADM

## 2023-01-08 RX ORDER — VITAMIN E 268 MG
400 CAPSULE ORAL DAILY
Status: DISCONTINUED | OUTPATIENT
Start: 2023-01-09 | End: 2023-01-12 | Stop reason: HOSPADM

## 2023-01-08 RX ORDER — ROSUVASTATIN CALCIUM 20 MG/1
40 TABLET, COATED ORAL NIGHTLY
Status: DISCONTINUED | OUTPATIENT
Start: 2023-01-08 | End: 2023-01-12 | Stop reason: HOSPADM

## 2023-01-08 RX ORDER — ASPIRIN 81 MG/1
324 TABLET, CHEWABLE ORAL ONCE
Status: COMPLETED | OUTPATIENT
Start: 2023-01-08 | End: 2023-01-08

## 2023-01-08 RX ORDER — BUDESONIDE 0.5 MG/2ML
0.5 INHALANT ORAL 2 TIMES DAILY
Status: DISCONTINUED | OUTPATIENT
Start: 2023-01-08 | End: 2023-01-12 | Stop reason: HOSPADM

## 2023-01-08 RX ORDER — IPRATROPIUM BROMIDE AND ALBUTEROL SULFATE 2.5; .5 MG/3ML; MG/3ML
3 SOLUTION RESPIRATORY (INHALATION) ONCE
Status: COMPLETED | OUTPATIENT
Start: 2023-01-08 | End: 2023-01-08

## 2023-01-08 RX ORDER — ACETAMINOPHEN 650 MG/1
650 SUPPOSITORY RECTAL EVERY 6 HOURS PRN
Status: DISCONTINUED | OUTPATIENT
Start: 2023-01-08 | End: 2023-01-12 | Stop reason: HOSPADM

## 2023-01-08 RX ORDER — PREGABALIN 25 MG/1
50 CAPSULE ORAL 3 TIMES DAILY
Status: DISCONTINUED | OUTPATIENT
Start: 2023-01-08 | End: 2023-01-12 | Stop reason: HOSPADM

## 2023-01-08 RX ORDER — CLOPIDOGREL BISULFATE 75 MG/1
75 TABLET ORAL DAILY
Status: DISCONTINUED | OUTPATIENT
Start: 2023-01-08 | End: 2023-01-12 | Stop reason: HOSPADM

## 2023-01-08 RX ORDER — ASPIRIN 81 MG/1
81 TABLET, CHEWABLE ORAL DAILY
Status: DISCONTINUED | OUTPATIENT
Start: 2023-01-09 | End: 2023-01-12 | Stop reason: HOSPADM

## 2023-01-08 RX ORDER — FUROSEMIDE 10 MG/ML
40 INJECTION INTRAMUSCULAR; INTRAVENOUS 2 TIMES DAILY
Status: DISCONTINUED | OUTPATIENT
Start: 2023-01-08 | End: 2023-01-09

## 2023-01-08 RX ADMIN — ROSUVASTATIN CALCIUM 40 MG: 20 TABLET, FILM COATED ORAL at 23:50

## 2023-01-08 RX ADMIN — METHYLPREDNISOLONE SODIUM SUCCINATE 40 MG: 40 INJECTION, POWDER, FOR SOLUTION INTRAMUSCULAR; INTRAVENOUS at 23:53

## 2023-01-08 RX ADMIN — CLOPIDOGREL BISULFATE 75 MG: 75 TABLET ORAL at 23:52

## 2023-01-08 RX ADMIN — METOPROLOL TARTRATE 50 MG: 50 TABLET, FILM COATED ORAL at 23:53

## 2023-01-08 RX ADMIN — FUROSEMIDE 40 MG: 10 INJECTION, SOLUTION INTRAMUSCULAR; INTRAVENOUS at 17:52

## 2023-01-08 RX ADMIN — PREGABALIN 50 MG: 25 CAPSULE ORAL at 23:52

## 2023-01-08 RX ADMIN — POTASSIUM BICARBONATE 40 MEQ: 782 TABLET, EFFERVESCENT ORAL at 15:22

## 2023-01-08 RX ADMIN — Medication 10 ML: at 15:30

## 2023-01-08 RX ADMIN — ENOXAPARIN SODIUM 40 MG: 100 INJECTION SUBCUTANEOUS at 23:53

## 2023-01-08 RX ADMIN — IPRATROPIUM BROMIDE AND ALBUTEROL SULFATE 3 AMPULE: .5; 2.5 SOLUTION RESPIRATORY (INHALATION) at 13:51

## 2023-01-08 RX ADMIN — ARFORMOTEROL TARTRATE 15 MCG: 15 SOLUTION RESPIRATORY (INHALATION) at 20:00

## 2023-01-08 RX ADMIN — ASPIRIN 81 MG CHEWABLE TABLET 324 MG: 81 TABLET CHEWABLE at 15:22

## 2023-01-08 RX ADMIN — IPRATROPIUM BROMIDE AND ALBUTEROL SULFATE 1 AMPULE: .5; 2.5 SOLUTION RESPIRATORY (INHALATION) at 20:00

## 2023-01-08 RX ADMIN — IOPAMIDOL 75 ML: 755 INJECTION, SOLUTION INTRAVENOUS at 13:29

## 2023-01-08 RX ADMIN — Medication 10 ML: at 23:57

## 2023-01-08 RX ADMIN — METHYLPREDNISOLONE SODIUM SUCCINATE 125 MG: 125 INJECTION, POWDER, FOR SOLUTION INTRAMUSCULAR; INTRAVENOUS at 15:16

## 2023-01-08 RX ADMIN — BUDESONIDE 500 MCG: 0.5 SUSPENSION RESPIRATORY (INHALATION) at 20:00

## 2023-01-08 ASSESSMENT — PAIN SCALES - GENERAL
PAINLEVEL_OUTOF10: 4
PAINLEVEL_OUTOF10: 6

## 2023-01-08 ASSESSMENT — PAIN - FUNCTIONAL ASSESSMENT: PAIN_FUNCTIONAL_ASSESSMENT: 0-10

## 2023-01-08 ASSESSMENT — PAIN DESCRIPTION - LOCATION
LOCATION: BUTTOCKS
LOCATION: CHEST

## 2023-01-08 NOTE — ED PROVIDER NOTES
[address]        Pt Name: Adonis Dalal  MRN: [id number]  [place] 1950  Date of evaluation: 1/8/2023  Provider: Rajeev Knott DO  PCP: Roro Fernandez MD  Note Started: 12:04 PM EST 1/8/23    CHIEF COMPLAINT       Chief Complaint   Patient presents with    Chest Pain     Poor historian. Slow to respond. Reports pain is uncomfortable pointing to his mid sternal area. Pt reports that he needs his oxygen. States that he wears 4 l at home and states that he has been out of o2 for 2 weeks. Influenza     Reports cough and mucus reports sxs for the past 2 weeks. HISTORY OF PRESENT ILLNESS: 1 or more Elements   History From: Patient    Limitations to history : Altered Mental Status and Intoxication    Adonis Dalal is a 71 y.o. male with a past medical history of alcohol abuse and COPD who presents to the emergency department with confusion and a complaint of chest pain that has been ongoing for the past few weeks. Patient states he drove here and believes he is at [facility] and states that the year is 2022. Patient states he usually wears 4 L nasal cannula oxygen at home, however he has not had it for the past 2 weeks. He complains of substernal chest pain with no radiation. He reports a productive cough. Nursing Notes were all reviewed and agreed with or any disagreements were addressed in the HPI. REVIEW OF EXTERNAL NOTE :       Recent admission 12/16/2022-patient noted to have a history of alcohol abuse with chronic noncompliance given his desire to smoke while on supplemental oxygen. REVIEW OF SYSTEMS :           Positives and Pertinent negatives as per HPI.      SURGICAL HISTORY     Past Surgical History:   Procedure Laterality Date    CORONARY ARTERY BYPASS GRAFT  2011       CURRENTMEDICATIONS       Previous Medications    ALBUTEROL (ACCUNEB) 0.63 MG/3ML NEBULIZER SOLUTION    Take 1 ampule by nebulization every 6 hours as needed for Wheezing    ALBUTEROL SULFATE HFA (PROVENTIL;VENTOLIN;PROAIR) 108 (90 BASE) MCG/ACT INHALER    Inhale 2 puffs into the lungs every 6 hours as needed for Wheezing    BUDESON-GLYCOPYRROL-FORMOTEROL 160-9-4.8 MCG/ACT AERO    Inhale 2 puffs into the lungs 2 times daily    CALCIUM-MAGNESIUM-ZINC 333..33-5 MG TABS    Take 2 tablets by mouth daily    CLOPIDOGREL (PLAVIX) 75 MG TABLET    Take 75 mg by mouth daily    GUAIFENESIN 400 MG TABLET    Take 400 mg by mouth 3 times daily as needed for Cough    IPRATROPIUM-ALBUTEROL (DUONEB) 0.5-2.5 (3) MG/3ML SOLN NEBULIZER SOLUTION    Take 1 vial by nebulization every 6 hours as needed for Shortness of Breath    LEVOTHYROXINE (SYNTHROID) 100 MCG TABLET    Take 100 mcg by mouth Daily    LUTEIN-ZEAXANTHIN PO    Take 1 tablet by mouth daily    MINERAL OIL-HYDROPHILIC PETROLATUM (HYDROPHOR) OINTMENT    Apply topically as needed for Dry Skin    MULTIPLE VITAMINS-MINERALS (THERAPEUTIC MULTIVITAMIN-MINERALS) TABLET    Take 1 tablet by mouth daily    OMEPRAZOLE (PRILOSEC) 40 MG DELAYED RELEASE CAPSULE    Take 40 mg by mouth daily    OXYGEN    Inhale into the lungs    ROSUVASTATIN (CRESTOR) 40 MG TABLET    Take 40 mg by mouth at bedtime    TRAMADOL (ULTRAM) 50 MG TABLET    Take 50 mg by mouth every 6 hours as needed for Pain.     VITAMIN E 400 UNIT CAPSULE    Take 400 Units by mouth daily       ALLERGIES     Tetracyclines & related    FAMILYHISTORY       Family History   Problem Relation Age of Onset    Heart Disease Mother     Diabetes Mother     Stroke Mother     Heart Disease Father     Diabetes Father     Stroke Father     Cancer Brother         SOCIAL HISTORY       Social History     Tobacco Use    Smoking status: Former     Packs/day: 1.50     Years: 50.00     Pack years: 75.00     Types: Cigarettes     Start date:      Quit date: 2016     Years since quittin.4    Smokeless tobacco: Current   Vaping Use    Vaping Use: Never used   Substance Use Topics    Alcohol use: Yes     Alcohol/week: 12.0 standard drinks     Types: 12 Cans of beer per week    Drug use: No       SCREENINGS        [place] Coma Scale  Eye Opening: Spontaneous  Best Verbal Response: Oriented  Best Motor Response: Obeys commands  [place] Coma Scale Score: 15                CIWA Assessment  BP: 110/60  Heart Rate: (!) 45           PHYSICAL EXAM  1 or more Elements     ED Triage Vitals   BP Temp Temp Source Heart Rate Resp SpO2 Height Weight   01/08/23 1121 01/08/23 1040 01/08/23 1040 01/08/23 1121 01/08/23 1121 01/08/23 1128 -- --   110/85 97.1 °F (36.2 °C) Temporal 60 26 (S) 100 %              Constitutional/General: Alert and oriented to person only, confused  Head: Normocephalic and atraumatic  Eyes: PERRL, EOMI, conjunctiva normal, sclera non icteric  ENT:  Oropharynx clear, handling secretions, no trismus, no asymmetry of the posterior oropharynx or uvular edema  Neck: Supple, full ROM, no stridor, no meningeal signs  Respiratory: Diminished breath sounds bilaterally with faint wheezes, not in respiratory distress  Cardiovascular:  Regular rate. Regular rhythm. 2+ distal pulses. Equal extremity pulses. Chest: No chest wall tenderness  GI:  Abdomen Soft, Non tender, Non distended. No rebound, guarding, or rigidity. No pulsatile masses. Musculoskeletal: Moves all extremities x 4. Warm and well perfused, no clubbing, no cyanosis, no edema. Capillary refill <3 seconds  Integument: skin warm and dry. No rashes.    Neurologic: GCS 14, no focal deficits, symmetric strength 5/5 in the upper and lower extremities bilaterally, slowed speech  Psychiatric: Normal Affect            DIAGNOSTIC RESULTS   LABS:    Labs Reviewed   TROPONIN - Abnormal; Notable for the following components:       Result Value    Troponin, High Sensitivity 100 (*)     All other components within normal limits   CBC WITH AUTO DIFFERENTIAL - Abnormal; Notable for the following components:    Lymphocytes % 13.1 (*)     Lymphocytes Absolute 1.00 (*)     All other components within normal limits   COMPREHENSIVE METABOLIC PANEL - Abnormal; Notable for the following components:    Potassium 3.2 (*)     All other components within normal limits   BRAIN NATRIURETIC PEPTIDE - Abnormal; Notable for the following components:    Pro-BNP 3,835 (*)     All other components within normal limits   URINALYSIS - Abnormal; Notable for the following components:    Leukocyte Esterase, Urine TRACE (*)     All other components within normal limits   BLOOD GAS, ARTERIAL - Abnormal; Notable for the following components:    pH, Blood Gas 7.467 (*)     PO2 142.7 (*)     O2 Sat 98.6 (*)     O2Hb 97.8 (*)     All other components within normal limits   SERUM DRUG SCREEN - Abnormal; Notable for the following components:    Acetaminophen Level <5.0 (*)     All other components within normal limits   TROPONIN - Abnormal; Notable for the following components:    Troponin, High Sensitivity 85 (*)     All other components within normal limits   LIPID PANEL - Abnormal; Notable for the following components:    LDL Calculated 131 (*)     All other components within normal limits   TSH - Abnormal; Notable for the following components:    TSH 48.450 (*)     All other components within normal limits   CBC - Abnormal; Notable for the following components:    RBC 3.48 (*)     Hemoglobin 11.0 (*)     Hematocrit 33.2 (*)     All other components within normal limits   COMPREHENSIVE METABOLIC PANEL - Abnormal; Notable for the following components:    Glucose 120 (*)     Calcium 8.1 (*)     Total Protein 5.8 (*)     Albumin 3.0 (*)     All other components within normal limits   POCT GLUCOSE - Abnormal; Notable for the following components:    Meter Glucose 70 (*)     All other components within normal limits   COVID-19, RAPID   RAPID INFLUENZA A/B ANTIGENS   LEGIONELLA ANTIGEN, URINE    Narrative:     Source: URV       Site: Urine Voided             STREP PNEUMONIAE ANTIGEN    Narrative:     Source: URINE       Site:              CULTURE, RESPIRATORY   MAGNESIUM   URINE DRUG SCREEN   MICROSCOPIC URINALYSIS   HEMOGLOBIN A1C   MAGNESIUM   PHOSPHORUS       As interpreted by me, the above displayed labs are abnormal. All other labs obtained during this visit were within normal range or not returned as of this dictation. EKG Interpretation  Interpreted by emergency department physician, Nestor Bowen, DO      Rate 61, sinus rhythm with first-degree AV block, marked sinus arrhythmia, left axis deviation, , no ST elevation, stable compared to previous EKG. RADIOLOGY:   Non-plain film images such as CT, Ultrasound and MRI are read by the radiologist. Plain radiographic images are visualized and preliminarily interpreted by the ED Provider with the below findings:    Chest x-ray shows cardiomegaly with no obvious pneumonia or pneumothorax. Interpretation per the Radiologist below, if available at the time of this note:    XR CHEST (2 VW)   Final Result   Heart is enlarged with increased interstitial markings seen throughout the   lung fields. Mild pulmonary vascular congestion cannot be excluded. CT HEAD WO CONTRAST   Final Result   Generalized atrophy and chronic changes seen within the brain with no acute   intracranial abnormality. CTA PULMONARY W CONTRAST   Final Result   No evidence of pulmonary embolism or acute pulmonary abnormality. Extensive   chronic changes seen within the lung fields bilaterally. No definite   superimposed acute infiltrate. Chronic changes seen within the chest and   upper abdomen. Stone in the gallbladder with small hiatal hernia. Nonobstructing stones seen in the left kidney. NM MYOCARDIAL SPECT REST EXERCISE OR RX    (Results Pending)     No results found. No results found.     PROCEDURES   Unless otherwise noted below, none          CRITICAL CARE TIME (.cct) none    PAST MEDICAL HISTORY/Chronic Conditions Affecting Care      has a past medical history of Alcohol dependence ([facility] [address] Arthritis, Chronic low back pain, COPD (chronic obstructive pulmonary disease) ([facility] [address] Coronary arteriosclerosis, Depression, Hyperlipidemia, Hypertension, Osteomyelitis ([facility] [address] Pneumonia, Rhinitis, Spinal stenosis, lumbar, and Thyroid disease.      EMERGENCY DEPARTMENT COURSE    Vitals:    Vitals:    01/09/23 1105 01/09/23 1115 01/09/23 1220 01/09/23 1234   BP: (!) 86/55 (!) 100/53 (!) 111/36 110/60   Pulse: (!) 48 (!) 47 (!) 46 (!) 45   Resp:   20 14   Temp:       TempSrc:       SpO2:           Patient was given the following medications:  Medications   sodium chloride flush 0.9 % injection 5-40 mL (10 mLs IntraVENous Given 1/8/23 9197)   sodium chloride flush 0.9 % injection 5-40 mL (has no administration in time range)   0.9 % sodium chloride infusion (has no administration in time range)   enoxaparin (LOVENOX) injection 40 mg (40 mg SubCUTAneous Given 1/8/23 2353)   ondansetron (ZOFRAN-ODT) disintegrating tablet 4 mg (has no administration in time range)     Or   ondansetron (ZOFRAN) injection 4 mg (has no administration in time range)   polyethylene glycol (GLYCOLAX) packet 17 g (has no administration in time range)   acetaminophen (TYLENOL) tablet 650 mg (has no administration in time range)     Or   acetaminophen (TYLENOL) suppository 650 mg (has no administration in time range)   hydrALAZINE (APRESOLINE) injection 10 mg (has no administration in time range)   melatonin tablet 3 mg (has no administration in time range)   calcium carbonate (TUMS) chewable tablet 500 mg (has no administration in time range)   ipratropium-albuterol (DUONEB) nebulizer solution 1 ampule (1 ampule Inhalation Given 1/9/23 1209)   methylPREDNISolone sodium (SOLU-MEDROL) injection 40 mg (40 mg IntraVENous Given 1/8/23 5833)   calcium elemental (OSCAL) tablet 500 mg (has no administration in time range) clopidogrel (PLAVIX) tablet 75 mg (75 mg Oral Given 1/8/23 2352)   diclofenac sodium (VOLTAREN) 1 % gel 2 g (has no administration in time range)   levothyroxine (SYNTHROID) tablet 125 mcg (has no administration in time range)   metoprolol tartrate (LOPRESSOR) tablet 50 mg (50 mg Oral Given 1/8/23 2353)   pantoprazole (PROTONIX) tablet 40 mg (has no administration in time range)   pregabalin (LYRICA) capsule 50 mg (50 mg Oral Given 1/8/23 2352)   traMADol (ULTRAM) tablet 50 mg (has no administration in time range)   vitamin E capsule 400 Units (has no administration in time range)   budesonide (PULMICORT) nebulizer suspension 500 mcg (500 mcg Nebulization Not Given 1/9/23 0930)   arformoterol tartrate (BROVANA) nebulizer solution 15 mcg (15 mcg Nebulization Not Given 1/9/23 0930)   aspirin chewable tablet 81 mg (has no administration in time range)   rosuvastatin (CRESTOR) tablet 40 mg (40 mg Oral Given 1/8/23 2350)   miconazole (MICOTIN) 2 % cream (has no administration in time range)   furosemide (LASIX) injection 40 mg (has no administration in time range)   ipratropium-albuterol (DUONEB) nebulizer solution 3 ampule (3 ampules Inhalation Given 1/8/23 1351)   methylPREDNISolone sodium (SOLU-MEDROL) injection 125 mg (125 mg IntraVENous Given 1/8/23 1516)   potassium bicarb-citric acid (EFFER-K) effervescent tablet 40 mEq (40 mEq Oral Given 1/8/23 1522)   sodium chloride flush 0.9 % injection 10 mL (10 mLs IntraVENous Given 1/8/23 1530)   iopamidol (ISOVUE-370) 76 % injection 75 mL (75 mLs IntraVENous Given 1/8/23 1329)   aspirin chewable tablet 324 mg (324 mg Oral Given 1/8/23 1522)   regadenoson (LEXISCAN) injection 0.4 mg (0.4 mg IntraVENous Given 1/9/23 1029)   technetium sestamibi (CARDIOLITE) injection 10 millicurie (11 millicuries IntraVENous Given 1/9/23 0917)   technetium sestamibi (CARDIOLITE) injection 30 millicurie (30 millicuries IntraVENous Given 1/9/23 3642)   0.9 % sodium chloride bolus (0 mLs IntraVENous Stopped 1/9/23 1056)   0.9 % sodium chloride bolus (750 mLs IntraVENous New Bag 1/9/23 1056)           Medical Decision Making/Differential Diagnosis:    CC/HPI Summary, Social Determinants of health, Records Reviewed, DDx, testing done/not done, ED Course, Reassessment, disposition considerations/shared decision making with patient, consults, disposition:      ED Course as of 01/09/23 1403   Sun Jan 08, 2023   1144 EKG: This EKG is signed and interpreted by me, Jolynn Hui MD    Rate: 61  Rhythm: Sinus  Interpretation: Sinus rhythm, sinus arrhythmia, first-degree heart block, left axis deviation, nonspecific ST and T wave abnormalities, QTC mildly prolonged at 465  Comparison: QTc mildly more prolonged, otherwise no significant changes when compared to prior EKG   [JA]   1206 Patient is a 29-year-old male with history of alcohol abuse and COPD on 4 L of oxygen at baseline presenting with confusion and chest pain. Patient states he drove himself here. He thinks it is 2022, and he believes he is at [facility].  He states has been having a cough and shortness of breath. He denies any recent alcohol use. I reviewed the patient's chart. He was admitted to the hospital from 12/16/2022-12/20/2022 for COPD exacerbation and respiratory failure. Patient was discharged home on 4 L of oxygen but states he has not had it at home. Chart indicates a history of noncompliance as well. PHYSICAL EXAM:  Vitals Reviewed  Constitutional/General: Alert and confused, appears ill, non toxic in NAD  Head: Normocephalic and atraumatic  Eyes: PERRL, EOMI  Mouth: Oropharynx clear, handling secretions, no trismus  Neck: Supple, full ROM,   Pulmonary: Lungs markedly diminished with scattered wheezes. Not in respiratory distress  Cardiovascular:  Regular rate and rhythm, no murmurs, gallops, or rubs. Abdomen: Soft, non tender, non distended,   Extremities: Moves all extremities x 4.  Warm and well perfused. No leg edema  Skin: warm and dry without rash  Neurologic: Awake, answering questions though appears intermittently confused, 5/5 strength of bilateral upper and lower extremities, normal sensation all extremities  Psych: Normal Affect     [JA]   1559 Repeat troponin downtrending with a delta of 15. [TO]      ED Course User Index  [JA] Augie Drake MD  [TO] Francheska Shelby,         This is a 67-year-old male with history of COPD, alcoholism, and noncompliance, here for chest pain and shortness of breath. Patient with confusion and slurred speech. He was placed on 4 L nasal cannula. Differential diagnosis includes ACS, COPD exacerbation, pneumonia, bronchitis, acute on chronic respiratory failure with hypoxia, intoxication versus alcohol withdrawal.  EKG with no STEMI with no significant changes compared to previous. Patient was treated with supplemental oxygen, duo nebs, and IV Solu-Medrol proceed with exacerbation. He was given aspirin for his chest pain and his potassium was replaced. COVID and influenza were negative. His CBC was within normal limits. ABG showed no CO2 retention. There was elevations in the BNP and a troponin of 100 with repeat of 85. Low concern for NSTEMI. CTA PE showed no evidence of pulmonary embolism. UDS and serum drug screen were unremarkable. Given his moderate risk heart score, confusion, and O2 requirements, decision was made to admit. Additional history is obtained by contacting the patient's son Gera Worley who reported concern for his dad's wellbeing given his history of noncompliance. Case was discussed with the hospitalist who agreed to accept the patient for admission. CONSULTS: (Who and What was discussed)  IP CONSULT TO INTERNAL MEDICINE        I am the Primary Clinician of Record. FINAL IMPRESSION      1. Chest pain, unspecified type    2.  Chronic obstructive pulmonary disease with acute exacerbation (HCC)          DISPOSITION/PLAN DISPOSITION Admitted 01/08/2023 04:18:50 PM      PATIENT REFERRED TO:  No follow-up provider specified. DISCHARGE MEDICATIONS:  New Prescriptions    No medications on file       DISCONTINUED MEDICATIONS:  Discontinued Medications    AMITRIPTYLINE (ELAVIL) 10 MG TABLET    Take 10 mg by mouth nightly    BUDESONIDE PO    Take by mouth    BUDESONIDE-FORMOTEROL (SYMBICORT) 160-4.5 MCG/ACT AERO    Inhale 2 puffs into the lungs 2 times daily    CALCIUM CARBONATE (OSCAL) 500 MG TABS TABLET    Take 500 mg by mouth daily    DICLOFENAC SODIUM 1 % GEL    Apply 2 g topically 2 times daily    FUROSEMIDE (LASIX) 40 MG TABLET    Take 40 mg by mouth 2 times daily    METOPROLOL TARTRATE (LOPRESSOR) 50 MG TABLET    Take 50 mg by mouth 2 times daily    MICONAZOLE (MICOTIN) 2 % CREAM    Apply topically 2 times daily Apply topically 2 times daily. MULTIPLE VITAMINS-MINERALS (MULTIVITAL-M PO)    Take by mouth    OMEGA-3 FATTY ACIDS (FISH OIL) 500 MG CAPS    Take by mouth    PANTOPRAZOLE (PROTONIX) 40 MG TABLET    Take 40 mg by mouth daily    PREGABALIN (LYRICA) 50 MG CAPSULE    Take 50 mg by mouth 3 times daily.     RANITIDINE HCL (RANITIDINE 150 MAX STRENGTH PO)    Take 300 mg by mouth    TIOTROPIUM (SPIRIVA) 18 MCG INHALATION CAPSULE    Inhale 2.5 mcg into the lungs daily              (Please note that portions of this note were completed with a voice recognition program.  Efforts were made to edit the dictations but occasionally words are mis-transcribed.)    Dane Bernstein DO (electronically signed)          Debbie Flynn DO  Resident  01/09/23 9974

## 2023-01-08 NOTE — LETTER
[address] [place] [address] Dr Department Medicaid  CERTIFICATION OF NECESSITY  FOR NON-EMERGENCY TRANSPORTATION   BY GROUND AMBULANCE      Individual Information   1. Name: Maura Rodriguez 2. [address] [place] [address] Dr Medicaid Billing Number:    3. Address: [address] [place] [address]      Transportation Provider Information   4. Provider Name:    5. [address] [place] [address] Dr Medicaid Provider Number:  National Provider Identifier (NPI):      Certification  7. Criteria:  During transport, this individual requires:  [x] Medical treatment or continuous     supervision by an EMT. [x] The administration or regulation of oxygen by another person. [] Supervised protective restraint. 8. Period Beginning Date:    5. Length  [x] Not more than  day(s)  [x] One Year     Additional Information Relevant to Certification   10. Comments or Explanations, If Necessary or Appropriate     Chest pain; AMS; A&Ox2; high fall risk; cognitive concern; h/o COPD 4 liters nasal cannula, can not self regulate. Certifying Practitioner Information   11. Name of Practitioner: Harpreet Lacey   [address] Medicaid Provider Number, If Applicable:  [address] Provider Identifier (NPI):      Signature Information   14. Date of Signature:  13. Name of Person Signin. Signature and Professional Designation:         [facility] [id number]  Rev. 2015    [address] [place] [address] Encounter Date/Time: 2023 [facility] Account: [de-identified]    MRN: [id number]    Patient: Maura Rodriguez    Contact Serial #: [id number]      ENCOUNTER          Patient Class: I Private Enc? No Unit  BDGaetana [address] [address]   Hospital Service:  INM   Encounter DX: Chest pain [R07.9]   ADM Provider: Juan Carlos Palacios DO   Procedure:     ATT Provider: Harpreet Lacey MD   REF Provider:        Admission DX: Chest pain, Chronic obstructive pulmonary disease with acute exacerbation ([facility] [address] Chest pain, unspecified type and DX codes: R07.9, J44.1, R07.9      PATIENT                 Name: Maura Rodriguez : 1950 (72 yrs)   [facility]  [facility] [address] Sex: Male   [place] city: [place] [address]         Marital Status:    Employer: RETIRED         [employer]: None   Primary Care Provider: Sergio Mclean MD         Primary Phone: [phone]   EMERGENCY CONTACT   Contact Name Legal Guardian? Relationship to Patient Home Phone Work Phone   1. Yaron Ramirez  2. New Ramirez      Child  Child      [phone]            GUARANTOR            Guarantor: Madden Flejosh     : 1950   Address: [address] [place] [address] [address] Sex: Male     [place] [address]     Relation to Patient: Self       Home Phone: [phone]   Guarantor ID: [id number]       Work Phone:     Guarantor Employer: RETIRED         Status: RETIRED      COVERAGE        PRIMARY INSURANCE   Payor: Taplister Plan: Taplister   Payor Address: [facility] [id number], [address] [facility]       Group Number:   Insurance Type: INDEMNITY   Subscriber Name: Cherelle Bryant : 1950   Subscriber ID: [id number] Pat. Rel. to Sub: Self   SECONDARY INSURANCE   Payor: MEDICARE Plan: MEDICARE PART A AND B   Payor Address:   [address] [address], [address] [address]          Group Number:   Insurance Type: INDEMNITY   Subscriber Name: Cherelle Bryant : 1950   Subscriber ID: [id number] Pat.  Rel. to Sub: SELF         CSN: [id number]

## 2023-01-08 NOTE — H&P
Hospitalist History & Physical      PCP: Thomas Rivera MD    Date of Service: Pt seen/examined on 1/8/2023     admitted to Inpatient with expected LOS greater than two midnights due to medical therapy. Chief Complaint:  had concerns including Chest Pain (Poor historian. Slow to respond. Reports pain is uncomfortable pointing to his mid sternal area. Pt reports that he needs his oxygen. States that he wears 4 l at home and states that he has been out of o2 for 2 weeks. ) and Influenza (Reports cough and mucus reports sxs for the past 2 weeks. ). History of Present Illness:    Mr. Octaviano Garcia, a 71y.o. year old male  who  has a past medical history of Alcohol dependence ([facility] [address] Arthritis, Chronic low back pain, COPD (chronic obstructive pulmonary disease) ([address] Coronary arteriosclerosis, Depression, Hyperlipidemia, Hypertension, Osteomyelitis ([address] Pneumonia, Rhinitis, Spinal stenosis, lumbar, and Thyroid disease. Patient presented to ED for chest pain from home. Patient had recent admission in December 2022 for COPD exacerbation was given IV steroids and had concerns for home oxygen as patient was denied oxygen due to his noncompliance with smoking. Currently patient states that he has been out of oxygen for 2 weeks. ER course significant for hypokalemia with potassium 3.2, proBNP elevated at 3835, elevated troponin at 100 that later decreased to 85, CT of head showed no acute intercranial abnormality and CTA of chest showed no evidence of PE, chest x-ray showed mild pulmonary vascular congestion. Upon assessment patient is slow to respond but does answer questions appropriately and states that he has not been able to bathe in 4 weeks and is uncertain of how to care for himself at home especially without oxygen.   Currently denies chest pain but states chest pain worsens with activity and is relieved by rest.  Patient admitted for concern over COPD exacerbation versus CHF, started on IV steroid and Lasix with stress test ordered. Past Medical History:        Diagnosis Date    Alcohol dependence ([facility] [address]     Arthritis     Chronic low back pain     COPD (chronic obstructive pulmonary disease) (HCC)     Coronary arteriosclerosis     Depression     Hyperlipidemia     Hypertension     Osteomyelitis (HCC)     mandible     Pneumonia     Rhinitis     Spinal stenosis, lumbar     Thyroid disease        Past Surgical History:        Procedure Laterality Date    CORONARY ARTERY BYPASS GRAFT  2011       Medications Prior to Admission:      Prior to Admission medications    Medication Sig Start Date End Date Taking? Authorizing Provider   budesonide-formoterol (SYMBICORT) 160-4.5 MCG/ACT AERO Inhale 2 puffs into the lungs 2 times daily 6/30/20   Lary Houser MD   diclofenac sodium 1 % GEL Apply 2 g topically 2 times daily    Historical Provider, MD   miconazole (MICOTIN) 2 % cream Apply topically 2 times daily Apply topically 2 times daily. Historical Provider, MD   Rosuvastatin Calcium 40 MG CPSP Take by mouth    Historical Provider, MD   metoprolol tartrate (LOPRESSOR) 50 MG tablet Take 50 mg by mouth 2 times daily    Historical Provider, MD   tiotropium (SPIRIVA) 18 MCG inhalation capsule Inhale 2.5 mcg into the lungs daily    Historical Provider, MD   albuterol (ACCUNEB) 0.63 MG/3ML nebulizer solution Take 1 ampule by nebulization every 6 hours as needed for Wheezing    Historical Provider, MD   pregabalin (LYRICA) 50 MG capsule Take 50 mg by mouth 3 times daily.     Historical Provider, MD   pantoprazole (PROTONIX) 40 MG tablet Take 40 mg by mouth daily    Historical Provider, MD   Omega-3 Fatty Acids (FISH OIL) 500 MG CAPS Take by mouth    Historical Provider, MD   raNITIdine HCl (RANITIDINE 150 MAX STRENGTH PO) Take 300 mg by mouth    Historical Provider, MD   furosemide (LASIX) 40 MG tablet Take 40 mg by mouth 2 times daily    Historical Provider, MD   traMADol (ULTRAM) 50 MG tablet Take 50 mg by mouth every 6 hours as needed for Pain. Historical Provider, MD   clopidogrel (PLAVIX) 75 MG tablet Take 75 mg by mouth daily    Historical Provider, MD   levothyroxine (SYNTHROID) 125 MCG tablet Take 125 mcg by mouth Daily    Historical Provider, MD   vitamin E 400 UNIT capsule Take 400 Units by mouth daily    Historical Provider, MD   calcium carbonate (OSCAL) 500 MG TABS tablet Take 500 mg by mouth daily    Historical Provider, MD   Multiple Vitamins-Minerals (MULTIVITAL-M PO) Take by mouth    Historical Provider, MD   LUTEIN-ZEAXANTHIN PO Take 1 tablet by mouth    Historical Provider, MD   OXYGEN Inhale into the lungs    Historical Provider, MD       Allergies:  Tetracyclines & related    Social History:    RESIDENCE: Home  TOBACCO:   reports that he quit smoking about 6 years ago. His smoking use included cigarettes. He started smoking about 57 years ago. He has a 75.00 pack-year smoking history. He uses smokeless tobacco.  ETOH:   reports current alcohol use of about 12.0 standard drinks per week. Family History:    Reviewed in detail and negative for DM, CAD, Cancer, CVA. Positive as follows\"      Problem Relation Age of Onset    Heart Disease Mother     Diabetes Mother     Stroke Mother     Heart Disease Father     Diabetes Father     Stroke Father     Cancer Brother        Review of Systems: All bolded are positive; please see HPI  General:  Fever, chills, diaphoresis, fatigue, malaise, night sweats, weight loss  Psychological:  Anxiety, disorientation, hallucinations. ENT:  Epistaxis, headaches, vertigo, visual changes. Cardiovascular:  Chest pain, irregular heartbeats, palpitations, paroxysmal nocturnal dyspnea. Respiratory:  Shortness of breath, coughing, sputum production, hemoptysis, wheezing, orthopnea.   Gastrointestinal:  Nausea, vomiting, diarrhea, heartburn, constipation, abdominal pain, hematemesis, hematochezia, melena, acholic stools  Genito-Urinary:  Dysuria, urgency, frequency, hematuria  Musculoskeletal:  Joint pain, joint stiffness, joint swelling, muscle pain  Neurology:  Headache, focal neurological deficits, weakness, numbness, paresthesia  Derm:  Rashes, ulcers, excoriations, bruising  Extremities:  Decreased ROM, peripheral edema, mottling    Physical Exam:  /74   Pulse 80   Temp 97.1 °F (36.2 °C) (Temporal)   Resp 15   SpO2 98%   General appearance: No apparent distress, appears stated age and cooperative. HEENT: Conjunctivae/corneas clear. Mucous membranes moist.  Neck: Supple. No JVD. Respiratory: Diminished to auscultation bilaterally. Normal respiratory effort. Cardiovascular:  RRR. S1, S2 without MRG. PV: Pulses palpable. No edema. Abdomen: Soft, non-tender, non-distended. +BS  Musculoskeletal: No obvious deformities. Skin: Normal skin color. No rashes or lesions. Good turgor. Neurologic:  Grossly non-focal. Awake, alert, following commands. Psychiatric: Alert and oriented, thought content appropriate, normal insight and judgement    Reviewed EKG and CXR personally      CBC:   Recent Labs     01/08/23  1153   WBC 7.7   RBC 4.03   HGB 12.6   HCT 38.7   MCV 96.0   RDW 13.6        BMP:   Recent Labs     01/08/23  1153      K 3.2*      CO2 26   BUN 8   CREATININE 1.2   MG 1.9     LFT:  Recent Labs     01/08/23  1153   PROT 6.8   ALKPHOS 126   ALT 8   AST 13   BILITOT 0.6     CE:  No results for input(s): [place] Bounds in the last 72 hours. PT/INR: No results for input(s): INR, APTT in the last 72 hours. BNP: No results for input(s): BNP in the last 72 hours.   ESR: No results found for: SEDRATE  CRP: No results found for: CRP  D Dimer:   Lab Results   Component Value Date    DDIMER 428 01/03/2018      Folate and B12: No results found for: [id number], No results found for: FOLATE  Lactic Acid:   Lab Results   Component Value Date    LACTA 1.1 02/04/2018     Thyroid Studies:   Lab Results   Component Value Date    TSH 5.610 (H) 01/16/2018       Oupatient labs:  Lab Results   Component Value Date    TSH 5.610 (H) 01/16/2018    INR 1.2 01/30/2018       Urinalysis:    Lab Results   Component Value Date/Time    NITRU Negative 01/08/2023 03:24 PM    WBCUA 2-5 01/08/2023 03:24 PM    BACTERIA NONE SEEN 01/08/2023 03:24 PM    RBCUA 0-1 01/08/2023 03:24 PM    BLOODU Negative 01/08/2023 03:24 PM    SPECGRAV <=1.005 01/08/2023 03:24 PM    GLUCOSEU Negative 01/08/2023 03:24 PM       Imaging:  XR CHEST (2 VW)    Result Date: 1/8/2023  Heart is enlarged with increased interstitial markings seen throughout the lung fields. Mild pulmonary vascular congestion cannot be excluded. CT HEAD WO CONTRAST    Result Date: 1/8/2023  Generalized atrophy and chronic changes seen within the brain with no acute intracranial abnormality. XR CHEST PORTABLE    Result Date: 12/16/2022  Mild bibasilar infiltrates and interstitial markings. CTA PULMONARY W CONTRAST    Result Date: 1/8/2023  No evidence of pulmonary embolism or acute pulmonary abnormality. Extensive chronic changes seen within the lung fields bilaterally. No definite superimposed acute infiltrate. Chronic changes seen within the chest and upper abdomen. Stone in the gallbladder with small hiatal hernia. Nonobstructing stones seen in the left kidney. Assessment:  Chest pain  Shortness of breath possibly secondary to COPD exacerbation versus CHF  Elevated troponin  History of alcohol dependence  History of COPD  Hyperlipidemia  Hypertension    Plan:    Continuous pulse ox  Continue 4 L nasal cannula to maintain SPO2 greater than 92%  Continue IV Lasix  Continue IV steroid  Continue DuoNeb, Pulmicort, Brovana  Stress test in a.m.  N.p.o. after midnight  Check lipid profile, hemoglobin A1c  Check respiratory culture  Follow labs  Continue aspirin, Lopressor, Crestor and rest of appropriate home medications    Diet: ADULT DIET;  Regular; Low Fat/Low Chol/High Fiber/YESSENIA; 1800 ml  Diet NPO  Code Status: Full Code  Surrogate decision maker confirmed with patient:   Extended Emergency Contact Information  Primary Emergency Contact: [address] [facility]  Mobile Phone: [phone]  Relation: Child  Secondary Emergency Contact: [address] [facility]  Mobile Phone: [phone]  Relation: Child    DVT Prophylaxis: [x]Lovenox []Heparin []PCD [] [address] []Encouraged ambulation  Disposition: []Med/Surg [x] Intermediate [] ICU/CCU  Admit status: [] Observation [x] Inpatient     +++++++++++++++++++++++++++++++++++++++++++++++++  MILTON Escobar/ [address]  +++++++++++++++++++++++++++++++++++++++++++++++++  NOTE: This report was transcribed using voice recognition software. Every effort was made to ensure accuracy; however, inadvertent computerized transcription errors may be present.

## 2023-01-08 NOTE — ED NOTES
Department of Emergency Medicine  FIRST PROVIDER TRIAGE NOTE             Independent MLP           1/8/23  11:23 AM EST    Date of Encounter: 1/8/23   MRN: [id number]      HPI: Lars Lesch is a 71 y.o. male who presents to the ED for Chest Pain (Poor historian. Slow to respond. Reports pain is uncomfortable pointing to his mid sternal area. Pt reports that he needs his oxygen. States that he wears 4 l at home and states that he has been out of o2 for 2 weeks. ) and Influenza (Reports cough and mucus reports sxs for the past 2 weeks. )  Presents for complaints of shortness of breath and chest tightness for the past 3 weeks. He states he is O2 dependent at 4 L nasal cannula however states he has been out of his oxygen for the past 2 weeks. He reports cough and a moist productive cough with mucus and sputum production. He is answering questions but does appear to be slow to respond. States he follows with the [place]. Unable to obtain a pulse oximetry in triage area. ROS: Negative for abd pain or back pain. PE: Gen Appearance/Constitutional: alert     Initial Plan of Care: All treatment areas with department are currently occupied. Plan to order/Initiate the following while awaiting opening in ED: labs, EKG, and imaging studies.   Initiate Treatment-Testing, Proceed toTreatment Area When Bed Available for ED Attending/MLP to Continue Care    Electronically signed by ANDREA Rasmussen CNP   DD: 1/8/23       ANDREA Champion CNP  01/08/23 1124

## 2023-01-08 NOTE — TELEPHONE ENCOUNTER
Writer contacted ED provider to inform of 30 day readmission risk. Writer's attempt to contact ED provider was unsuccessful.       Call Back: If you need to call back to inform of disposition you can contact me at [phone]

## 2023-01-09 ENCOUNTER — APPOINTMENT (OUTPATIENT)
Dept: NUCLEAR MEDICINE | Age: 73
End: 2023-01-09
Payer: OTHER GOVERNMENT

## 2023-01-09 ENCOUNTER — APPOINTMENT (OUTPATIENT)
Dept: NON INVASIVE DIAGNOSTICS | Age: 73
End: 2023-01-09
Payer: OTHER GOVERNMENT

## 2023-01-09 LAB
ALBUMIN SERPL-MCNC: 3 G/DL (ref 3.5–5.2)
ALP BLD-CCNC: 109 U/L (ref 40–129)
ALT SERPL-CCNC: 6 U/L (ref 0–40)
ANION GAP SERPL CALCULATED.3IONS-SCNC: 10 MMOL/L (ref 7–16)
AST SERPL-CCNC: 11 U/L (ref 0–39)
BILIRUB SERPL-MCNC: 0.7 MG/DL (ref 0–1.2)
BUN BLDV-MCNC: 11 MG/DL (ref 6–23)
CALCIUM SERPL-MCNC: 8.1 MG/DL (ref 8.6–10.2)
CHLORIDE BLD-SCNC: 103 MMOL/L (ref 98–107)
CHOLESTEROL, TOTAL: 187 MG/DL (ref 0–199)
CO2: 24 MMOL/L (ref 22–29)
CREAT SERPL-MCNC: 1.1 MG/DL (ref 0.7–1.2)
EKG ATRIAL RATE: 74 BPM
EKG P AXIS: 82 DEGREES
EKG P-R INTERVAL: 218 MS
EKG Q-T INTERVAL: 462 MS
EKG QRS DURATION: 100 MS
EKG QTC CALCULATION (BAZETT): 465 MS
EKG R AXIS: -41 DEGREES
EKG T AXIS: 67 DEGREES
EKG VENTRICULAR RATE: 61 BPM
GFR SERPL CREATININE-BSD FRML MDRD: >60 ML/MIN/1.73
GLUCOSE BLD-MCNC: 120 MG/DL (ref 74–99)
HBA1C MFR BLD: 5.5 % (ref 4–5.6)
HCT VFR BLD CALC: 33.2 % (ref 37–54)
HDLC SERPL-MCNC: 40 MG/DL
HEMOGLOBIN: 11 G/DL (ref 12.5–16.5)
L. PNEUMOPHILA SEROGP 1 UR AG: NORMAL
LDL CHOLESTEROL CALCULATED: 131 MG/DL (ref 0–99)
LV EF: 50 %
LVEF MODALITY: NORMAL
MAGNESIUM: 1.9 MG/DL (ref 1.6–2.6)
MCH RBC QN AUTO: 31.6 PG (ref 26–35)
MCHC RBC AUTO-ENTMCNC: 33.1 % (ref 32–34.5)
MCV RBC AUTO: 95.4 FL (ref 80–99.9)
PDW BLD-RTO: 13.7 FL (ref 11.5–15)
PHOSPHORUS: 2.7 MG/DL (ref 2.5–4.5)
PLATELET # BLD: 285 E9/L (ref 130–450)
PMV BLD AUTO: 10.3 FL (ref 7–12)
POTASSIUM SERPL-SCNC: 4 MMOL/L (ref 3.5–5)
RBC # BLD: 3.48 E12/L (ref 3.8–5.8)
SODIUM BLD-SCNC: 137 MMOL/L (ref 132–146)
STREP PNEUMONIAE ANTIGEN, URINE: NORMAL
TOTAL PROTEIN: 5.8 G/DL (ref 6.4–8.3)
TRIGL SERPL-MCNC: 82 MG/DL (ref 0–149)
TSH SERPL DL<=0.05 MIU/L-ACNC: 48.45 UIU/ML (ref 0.27–4.2)
VLDLC SERPL CALC-MCNC: 16 MG/DL
WBC # BLD: 9.8 E9/L (ref 4.5–11.5)

## 2023-01-09 PROCEDURE — 3430000000 HC RX DIAGNOSTIC RADIOPHARMACEUTICAL: Performed by: PHYSICIAN ASSISTANT

## 2023-01-09 PROCEDURE — 6360000002 HC RX W HCPCS: Performed by: EMERGENCY MEDICINE

## 2023-01-09 PROCEDURE — 78452 HT MUSCLE IMAGE SPECT MULT: CPT

## 2023-01-09 PROCEDURE — 83735 ASSAY OF MAGNESIUM: CPT

## 2023-01-09 PROCEDURE — 2580000003 HC RX 258: Performed by: INTERNAL MEDICINE

## 2023-01-09 PROCEDURE — 84443 ASSAY THYROID STIM HORMONE: CPT

## 2023-01-09 PROCEDURE — 85027 COMPLETE CBC AUTOMATED: CPT

## 2023-01-09 PROCEDURE — A9500 TC99M SESTAMIBI: HCPCS | Performed by: PHYSICIAN ASSISTANT

## 2023-01-09 PROCEDURE — 80061 LIPID PANEL: CPT

## 2023-01-09 PROCEDURE — 93016 CV STRESS TEST SUPVJ ONLY: CPT | Performed by: INTERNAL MEDICINE

## 2023-01-09 PROCEDURE — 6360000002 HC RX W HCPCS: Performed by: INTERNAL MEDICINE

## 2023-01-09 PROCEDURE — 93018 CV STRESS TEST I&R ONLY: CPT | Performed by: INTERNAL MEDICINE

## 2023-01-09 PROCEDURE — 78452 HT MUSCLE IMAGE SPECT MULT: CPT | Performed by: INTERNAL MEDICINE

## 2023-01-09 PROCEDURE — 83036 HEMOGLOBIN GLYCOSYLATED A1C: CPT

## 2023-01-09 PROCEDURE — 94640 AIRWAY INHALATION TREATMENT: CPT

## 2023-01-09 PROCEDURE — 93010 ELECTROCARDIOGRAM REPORT: CPT | Performed by: INTERNAL MEDICINE

## 2023-01-09 PROCEDURE — 2060000000 HC ICU INTERMEDIATE R&B

## 2023-01-09 PROCEDURE — 93017 CV STRESS TEST TRACING ONLY: CPT

## 2023-01-09 PROCEDURE — 80053 COMPREHEN METABOLIC PANEL: CPT

## 2023-01-09 PROCEDURE — 6370000000 HC RX 637 (ALT 250 FOR IP): Performed by: INTERNAL MEDICINE

## 2023-01-09 PROCEDURE — 84100 ASSAY OF PHOSPHORUS: CPT

## 2023-01-09 RX ORDER — 0.9 % SODIUM CHLORIDE 0.9 %
250 INTRAVENOUS SOLUTION INTRAVENOUS ONCE
Status: COMPLETED | OUTPATIENT
Start: 2023-01-09 | End: 2023-01-09

## 2023-01-09 RX ORDER — TECHNETIUM TC-99M SESTAMIBI 1 MG/10ML
10 INJECTION INTRAVENOUS
Status: COMPLETED | OUTPATIENT
Start: 2023-01-09 | End: 2023-01-09

## 2023-01-09 RX ORDER — M-VIT,TX,IRON,MINS/CALC/FOLIC 27MG-0.4MG
1 TABLET ORAL DAILY
COMMUNITY

## 2023-01-09 RX ORDER — GUAIFENESIN 400 MG/1
400 TABLET ORAL 3 TIMES DAILY PRN
COMMUNITY

## 2023-01-09 RX ORDER — OMEPRAZOLE 40 MG/1
40 CAPSULE, DELAYED RELEASE ORAL DAILY
COMMUNITY

## 2023-01-09 RX ORDER — ALBUTEROL SULFATE 90 UG/1
2 AEROSOL, METERED RESPIRATORY (INHALATION) EVERY 6 HOURS PRN
COMMUNITY

## 2023-01-09 RX ORDER — TECHNETIUM TC-99M SESTAMIBI 1 MG/10ML
30 INJECTION INTRAVENOUS
Status: COMPLETED | OUTPATIENT
Start: 2023-01-09 | End: 2023-01-09

## 2023-01-09 RX ORDER — PETROLATUM 42 G/100G
OINTMENT TOPICAL PRN
COMMUNITY

## 2023-01-09 RX ORDER — IPRATROPIUM BROMIDE AND ALBUTEROL SULFATE 2.5; .5 MG/3ML; MG/3ML
1 SOLUTION RESPIRATORY (INHALATION) EVERY 6 HOURS PRN
COMMUNITY

## 2023-01-09 RX ORDER — 0.9 % SODIUM CHLORIDE 0.9 %
750 INTRAVENOUS SOLUTION INTRAVENOUS ONCE
Status: COMPLETED | OUTPATIENT
Start: 2023-01-09 | End: 2023-01-09

## 2023-01-09 RX ORDER — DROPERIDOL 2.5 MG/ML
5 INJECTION, SOLUTION INTRAMUSCULAR; INTRAVENOUS ONCE
Status: COMPLETED | OUTPATIENT
Start: 2023-01-09 | End: 2023-01-09

## 2023-01-09 RX ORDER — FUROSEMIDE 10 MG/ML
40 INJECTION INTRAMUSCULAR; INTRAVENOUS DAILY
Status: DISCONTINUED | OUTPATIENT
Start: 2023-01-10 | End: 2023-01-10

## 2023-01-09 RX ADMIN — IPRATROPIUM BROMIDE AND ALBUTEROL SULFATE 1 AMPULE: .5; 2.5 SOLUTION RESPIRATORY (INHALATION) at 20:21

## 2023-01-09 RX ADMIN — Medication 30 MILLICURIE: at 10:42

## 2023-01-09 RX ADMIN — REGADENOSON 0.4 MG: 0.08 INJECTION, SOLUTION INTRAVENOUS at 10:29

## 2023-01-09 RX ADMIN — PREGABALIN 50 MG: 25 CAPSULE ORAL at 21:04

## 2023-01-09 RX ADMIN — ROSUVASTATIN CALCIUM 40 MG: 20 TABLET, FILM COATED ORAL at 21:04

## 2023-01-09 RX ADMIN — SODIUM CHLORIDE 750 ML: 9 INJECTION, SOLUTION INTRAVENOUS at 10:56

## 2023-01-09 RX ADMIN — IPRATROPIUM BROMIDE AND ALBUTEROL SULFATE 1 AMPULE: .5; 2.5 SOLUTION RESPIRATORY (INHALATION) at 12:09

## 2023-01-09 RX ADMIN — Medication 10 ML: at 21:01

## 2023-01-09 RX ADMIN — BUDESONIDE 500 MCG: 0.5 SUSPENSION RESPIRATORY (INHALATION) at 20:21

## 2023-01-09 RX ADMIN — ARFORMOTEROL TARTRATE 15 MCG: 15 SOLUTION RESPIRATORY (INHALATION) at 20:21

## 2023-01-09 RX ADMIN — Medication 11 MILLICURIE: at 09:17

## 2023-01-09 RX ADMIN — METHYLPREDNISOLONE SODIUM SUCCINATE 40 MG: 40 INJECTION, POWDER, FOR SOLUTION INTRAMUSCULAR; INTRAVENOUS at 23:25

## 2023-01-09 RX ADMIN — DROPERIDOL 5 MG: 2.5 INJECTION, SOLUTION INTRAMUSCULAR; INTRAVENOUS at 16:02

## 2023-01-09 RX ADMIN — SODIUM CHLORIDE 250 ML: 9 INJECTION, SOLUTION INTRAVENOUS at 10:35

## 2023-01-09 NOTE — ED NOTES
Patient pulling at second line placed by RN, patient removed heart monitor, BP cuff, clothes, and continously removing NC. Patient reoriented multiple times and explained why patient needs to lay down and keep monitor on along with NC.      Lili Holder RN  01/09/23 2816

## 2023-01-09 NOTE — ED NOTES
Patient ripped IV out, ripped all monitoring systems off. Patient refusing to put oxygen back on. Patient wants to leave. Dr. Jeramy Chi, [facility] physicians paged via perfect serve.       Lexi Weiner RN  01/09/23 4000

## 2023-01-09 NOTE — ED NOTES
Spoke to Dr. Loi Sutton regarding patient wanting to sign out AMA. Patient A&Ox4 knows the year, knows the month, knows the president. Knows name and date of birth. Patient has no way of getting presicribed O2. Patient doesn't have a tank of oxygen in his car. Patient states that he can get oxygen Thursday. HR 71, SpO2 99% on 4L. /100. Patient agitated and wants to leave.  Trying spO2 test on RA     Josef Jaime RN  01/09/23 0891

## 2023-01-09 NOTE — PROCEDURES
Aruna Garcia Nuclear Stress Test:    Cardiologist: Dr. Cole Pain EKG: Sinus bradycardia, first-degree block, nonspecific ST-T changes, abnormal EKG. Patient's baseline blood pressure was in the 90s. Indications for study: Chest pain    1. No chest pain  2. No new arrhythmias  3. No EKG changes suggestive of stress induced ischemia  4. Nuclear images pending  5. Patient developed hypotension following adenosine infusion blood pressure dropping into the 70s and 80s. Patient was initiated on IV fluid bolus 1 L. Patient appears dehydrated on clinical exam.    Javan Ji MD., [facility]    [facility]) Cardiology

## 2023-01-09 NOTE — ED NOTES
RN on 61 refusing patient d/t no sitter at this time.  Charge nurse notified     Giuliana Wagner RN  01/08/23 9114

## 2023-01-09 NOTE — PROGRESS NOTES
Hospitalist Progress Note      Synopsis: Patient with a hx of COPD, depression, HLD, HTN, admitted on 1/8/2023 after presenting for chest pain and shortness of breath. Subjective  Continues to be short of breath   Desheveled    Exam:  BP 93/68   Pulse 57   Temp 97.3 °F (36.3 °C) (Oral)   Resp 20   SpO2 100%   General appearance: No apparent distress, appears stated age and cooperative. HEENT: Conjunctivae/corneas clear. Mucous membranes moist.  Neck: Supple. No JVD. Respiratory: Diminished to auscultation bilaterally. Normal respiratory effort. Cardiovascular:  RRR. S1, S2 without MRG. PV: Pulses palpable. No edema. Abdomen: Soft, non-tender, non-distended. +BS  Musculoskeletal: No obvious deformities. Skin: Normal skin color. No rashes or lesions. Good turgor. Neurologic:  Grossly non-focal. Awake, alert, following commands.    Psychiatric: Alert and oriented, thought content appropriate, normal insight and judgement     Medications:  Reviewed    Infusion Medications    sodium chloride       Scheduled Medications    sodium chloride flush  5-40 mL IntraVENous 2 times per day    enoxaparin  40 mg SubCUTAneous Daily    ipratropium-albuterol  1 ampule Inhalation Q4H While awake    methylPREDNISolone  40 mg IntraVENous Q12H    calcium elemental  500 mg Oral Daily    clopidogrel  75 mg Oral Daily    diclofenac sodium  2 g Topical BID    levothyroxine  125 mcg Oral Daily    metoprolol tartrate  50 mg Oral BID    pantoprazole  40 mg Oral Daily    pregabalin  50 mg Oral TID    vitamin E  400 Units Oral Daily    budesonide  0.5 mg Nebulization BID    arformoterol tartrate  15 mcg Nebulization BID    furosemide  40 mg IntraVENous BID    aspirin  81 mg Oral Daily    rosuvastatin  40 mg Oral Nightly    miconazole   Topical BID     PRN Meds: regadenoson, technetium sestamibi, sodium chloride flush, sodium chloride, ondansetron **OR** ondansetron, polyethylene glycol, acetaminophen **OR** acetaminophen, hydrALAZINE, melatonin, calcium carbonate, traMADol    I/O  No intake or output data in the 24 hours ending 01/09/23 0959    Labs:   Recent Labs     01/08/23  1153 01/09/23  0407   WBC 7.7 9.8   HGB 12.6 11.0*   HCT 38.7 33.2*    285       Recent Labs     01/08/23  1153 01/09/23  0407    137   K 3.2* 4.0    103   CO2 26 24   BUN 8 11   CREATININE 1.2 1.1   CALCIUM 9.2 8.1*   PHOS  --  2.7       Recent Labs     01/08/23  1153 01/09/23  0407   PROT 6.8 5.8*   ALKPHOS 126 109   ALT 8 6   AST 13 11   BILITOT 0.6 0.7       No results for input(s): INR in the last 72 hours. No results for input(s): Ander Rhea in the last 72 hours. Chronic labs:  Lab Results   Component Value Date    CHOL 187 01/09/2023    TRIG 82 01/09/2023    HDL 40 01/09/2023    LDLCALC 131 (H) 01/09/2023    TSH 48.450 (H) 01/09/2023    INR 1.2 01/30/2018    LABA1C 5.5 01/09/2023       Radiology:  Imaging studies reviewed today. ASSESSMENT:  Chest pain  Chronic hypoxic respiratory failure  Recent COPD exacerbation  HLD  HTN    PLAN:  Continue home oxygen  Stress test ordered for this am, follow up results  Continue steroids, diuresis for now, but will reduce diuretic frequency   Strict I/O,monitor renal function  Will need oxygen set up for him prior to discharge      Diet: Diet NPO  Code Status: Full Code  PT/OT Eval Status:   as needed  DVT Prophylaxis:   lovenox  Recommended disposition at discharge:  home at [place]     +++++++++++++++++++++++++++++++++++++++++++++++++  Radha Denney MD   [facility].  +++++++++++++++++++++++++++++++++++++++++++++++++  NOTE: This report was transcribed using voice recognition software. Every effort was made to ensure accuracy; however, inadvertent computerized transcription errors may be present.

## 2023-01-09 NOTE — ED NOTES
Patient found standing at bottom of bed with IV tubing wrapped around body. Patient reoriented and helped back into bed. Multiple attempts for  made by RN and charge nurse. Per staffing no available help. Patient has yellow socks on, sides rails up, call light in reach, bedside table reachable, urinal placed at bedside. all staff notified to keep an eye out for patient getting out of bed. Vitals updated. Patient reoriented to place and situation.       Adebayo Ramirez RN  01/09/23 3679

## 2023-01-10 PROBLEM — R77.8 ELEVATED TROPONIN: Status: ACTIVE | Noted: 2023-01-10

## 2023-01-10 PROBLEM — I21.4 NSTEMI (NON-ST ELEVATED MYOCARDIAL INFARCTION) (HCC): Status: ACTIVE | Noted: 2023-01-10

## 2023-01-10 PROBLEM — R94.39 ABNORMAL CARDIOVASCULAR STRESS TEST: Status: ACTIVE | Noted: 2023-01-10

## 2023-01-10 PROBLEM — I35.0 SEVERE AORTIC STENOSIS: Status: ACTIVE | Noted: 2023-01-10

## 2023-01-10 PROBLEM — R94.39 ABNORMAL STRESS TEST: Status: ACTIVE | Noted: 2023-01-10

## 2023-01-10 LAB
ABO/RH: NORMAL
ALBUMIN SERPL-MCNC: 3.3 G/DL (ref 3.5–5.2)
ALP BLD-CCNC: 113 U/L (ref 40–129)
ALT SERPL-CCNC: 7 U/L (ref 0–40)
ANION GAP SERPL CALCULATED.3IONS-SCNC: 11 MMOL/L (ref 7–16)
ANTIBODY SCREEN: NORMAL
AST SERPL-CCNC: 13 U/L (ref 0–39)
BILIRUB SERPL-MCNC: 0.5 MG/DL (ref 0–1.2)
BUN BLDV-MCNC: 14 MG/DL (ref 6–23)
CALCIUM SERPL-MCNC: 8.7 MG/DL (ref 8.6–10.2)
CHLORIDE BLD-SCNC: 104 MMOL/L (ref 98–107)
CO2: 23 MMOL/L (ref 22–29)
CREAT SERPL-MCNC: 1.1 MG/DL (ref 0.7–1.2)
GFR SERPL CREATININE-BSD FRML MDRD: >60 ML/MIN/1.73
GLUCOSE BLD-MCNC: 120 MG/DL (ref 74–99)
HCT VFR BLD CALC: 36.6 % (ref 37–54)
HEMOGLOBIN: 11.7 G/DL (ref 12.5–16.5)
LV EF: 53 %
LVEF MODALITY: NORMAL
MCH RBC QN AUTO: 31.4 PG (ref 26–35)
MCHC RBC AUTO-ENTMCNC: 32 % (ref 32–34.5)
MCV RBC AUTO: 98.1 FL (ref 80–99.9)
PDW BLD-RTO: 13.8 FL (ref 11.5–15)
PLATELET # BLD: 259 E9/L (ref 130–450)
PMV BLD AUTO: 10.8 FL (ref 7–12)
POTASSIUM SERPL-SCNC: 4.3 MMOL/L (ref 3.5–5)
RBC # BLD: 3.73 E12/L (ref 3.8–5.8)
SODIUM BLD-SCNC: 138 MMOL/L (ref 132–146)
T4 FREE: <0.1 NG/DL (ref 0.93–1.7)
TOTAL PROTEIN: 6.1 G/DL (ref 6.4–8.3)
WBC # BLD: 7.9 E9/L (ref 4.5–11.5)

## 2023-01-10 PROCEDURE — 97161 PT EVAL LOW COMPLEX 20 MIN: CPT

## 2023-01-10 PROCEDURE — 84439 ASSAY OF FREE THYROXINE: CPT

## 2023-01-10 PROCEDURE — 36415 COLL VENOUS BLD VENIPUNCTURE: CPT

## 2023-01-10 PROCEDURE — 97530 THERAPEUTIC ACTIVITIES: CPT

## 2023-01-10 PROCEDURE — 94640 AIRWAY INHALATION TREATMENT: CPT

## 2023-01-10 PROCEDURE — 2060000000 HC ICU INTERMEDIATE R&B

## 2023-01-10 PROCEDURE — 86850 RBC ANTIBODY SCREEN: CPT

## 2023-01-10 PROCEDURE — 80053 COMPREHEN METABOLIC PANEL: CPT

## 2023-01-10 PROCEDURE — 86901 BLOOD TYPING SEROLOGIC RH(D): CPT

## 2023-01-10 PROCEDURE — 6370000000 HC RX 637 (ALT 250 FOR IP): Performed by: INTERNAL MEDICINE

## 2023-01-10 PROCEDURE — APPSS180 APP SPLIT SHARED TIME > 60 MINUTES: Performed by: NURSE PRACTITIONER

## 2023-01-10 PROCEDURE — 93005 ELECTROCARDIOGRAM TRACING: CPT | Performed by: INTERNAL MEDICINE

## 2023-01-10 PROCEDURE — 2700000000 HC OXYGEN THERAPY PER DAY

## 2023-01-10 PROCEDURE — 2580000003 HC RX 258: Performed by: INTERNAL MEDICINE

## 2023-01-10 PROCEDURE — 93306 TTE W/DOPPLER COMPLETE: CPT

## 2023-01-10 PROCEDURE — 86900 BLOOD TYPING SEROLOGIC ABO: CPT

## 2023-01-10 PROCEDURE — 99223 1ST HOSP IP/OBS HIGH 75: CPT | Performed by: INTERNAL MEDICINE

## 2023-01-10 PROCEDURE — 2580000003 HC RX 258: Performed by: FAMILY MEDICINE

## 2023-01-10 PROCEDURE — 85027 COMPLETE CBC AUTOMATED: CPT

## 2023-01-10 RX ORDER — RANOLAZINE 500 MG/1
500 TABLET, EXTENDED RELEASE ORAL 2 TIMES DAILY
Status: DISCONTINUED | OUTPATIENT
Start: 2023-01-10 | End: 2023-01-12 | Stop reason: HOSPADM

## 2023-01-10 RX ORDER — FUROSEMIDE 40 MG/1
40 TABLET ORAL DAILY
Status: DISCONTINUED | OUTPATIENT
Start: 2023-01-11 | End: 2023-01-11

## 2023-01-10 RX ORDER — PREDNISONE 20 MG/1
20 TABLET ORAL 2 TIMES DAILY
Status: DISCONTINUED | OUTPATIENT
Start: 2023-01-10 | End: 2023-01-12 | Stop reason: HOSPADM

## 2023-01-10 RX ORDER — 0.9 % SODIUM CHLORIDE 0.9 %
500 INTRAVENOUS SOLUTION INTRAVENOUS ONCE
Status: COMPLETED | OUTPATIENT
Start: 2023-01-10 | End: 2023-01-11

## 2023-01-10 RX ORDER — FUROSEMIDE 40 MG/1
40 TABLET ORAL 2 TIMES DAILY
Status: DISCONTINUED | OUTPATIENT
Start: 2023-01-10 | End: 2023-01-10

## 2023-01-10 RX ADMIN — BUDESONIDE 500 MCG: 0.5 SUSPENSION RESPIRATORY (INHALATION) at 19:25

## 2023-01-10 RX ADMIN — PREDNISONE 20 MG: 20 TABLET ORAL at 23:45

## 2023-01-10 RX ADMIN — ASPIRIN 81 MG CHEWABLE TABLET 81 MG: 81 TABLET CHEWABLE at 10:12

## 2023-01-10 RX ADMIN — PREGABALIN 50 MG: 25 CAPSULE ORAL at 15:40

## 2023-01-10 RX ADMIN — CLOPIDOGREL BISULFATE 75 MG: 75 TABLET ORAL at 10:12

## 2023-01-10 RX ADMIN — BUDESONIDE 500 MCG: 0.5 SUSPENSION RESPIRATORY (INHALATION) at 08:11

## 2023-01-10 RX ADMIN — ROSUVASTATIN CALCIUM 40 MG: 20 TABLET, FILM COATED ORAL at 23:45

## 2023-01-10 RX ADMIN — LEVOTHYROXINE SODIUM 125 MCG: 25 TABLET ORAL at 06:24

## 2023-01-10 RX ADMIN — Medication 10 ML: at 10:14

## 2023-01-10 RX ADMIN — PREDNISONE 20 MG: 20 TABLET ORAL at 12:07

## 2023-01-10 RX ADMIN — SODIUM CHLORIDE 500 ML: 9 INJECTION, SOLUTION INTRAVENOUS at 21:44

## 2023-01-10 RX ADMIN — IPRATROPIUM BROMIDE AND ALBUTEROL SULFATE 1 AMPULE: .5; 2.5 SOLUTION RESPIRATORY (INHALATION) at 11:51

## 2023-01-10 RX ADMIN — IPRATROPIUM BROMIDE AND ALBUTEROL SULFATE 1 AMPULE: .5; 2.5 SOLUTION RESPIRATORY (INHALATION) at 08:11

## 2023-01-10 RX ADMIN — ARFORMOTEROL TARTRATE 15 MCG: 15 SOLUTION RESPIRATORY (INHALATION) at 19:25

## 2023-01-10 RX ADMIN — IPRATROPIUM BROMIDE AND ALBUTEROL SULFATE 1 AMPULE: .5; 2.5 SOLUTION RESPIRATORY (INHALATION) at 19:25

## 2023-01-10 RX ADMIN — RANOLAZINE 500 MG: 500 TABLET, EXTENDED RELEASE ORAL at 12:06

## 2023-01-10 RX ADMIN — ARFORMOTEROL TARTRATE 15 MCG: 15 SOLUTION RESPIRATORY (INHALATION) at 08:11

## 2023-01-10 RX ADMIN — FUROSEMIDE 40 MG: 40 TABLET ORAL at 12:07

## 2023-01-10 RX ADMIN — RANOLAZINE 500 MG: 500 TABLET, EXTENDED RELEASE ORAL at 23:45

## 2023-01-10 RX ADMIN — METOPROLOL TARTRATE 50 MG: 50 TABLET, FILM COATED ORAL at 12:07

## 2023-01-10 RX ADMIN — IPRATROPIUM BROMIDE AND ALBUTEROL SULFATE 1 AMPULE: .5; 2.5 SOLUTION RESPIRATORY (INHALATION) at 15:47

## 2023-01-10 NOTE — CARE COORDINATION
Referral made to [facility] with [employer] for any accepting facilities as pt may have Medicare also. This CM also is checking pt's benefits for potential Medicare through patient access.

## 2023-01-10 NOTE — PLAN OF CARE
Problem: Discharge Planning  Goal: Discharge to home or other facility with appropriate resources  Outcome: Progressing  Flowsheets (Taken 1/9/2023 2300 by Bin Amaral, RN)  Discharge to home or other facility with appropriate resources: Identify barriers to discharge with patient and caregiver     Problem: Risk for Elopement  Goal: Patient will not exit the unit/facility without proper excort  Outcome: Progressing  Flowsheets (Taken 1/9/2023 2300 by Bin Amaral RN)  Nursing Interventions for Elopement Risk:   Assist with personal care needs such as toileting, eating, dressing, as needed to reduce the risk of wandering   Communicate/escalate to charge nurse the risk of elopement   Make sure patient has all necessary personal care items   Place patient in room far away from exits and stairways   Reduce environmental triggers   Shoes and clothing collected and placed in gown attire     Problem: Safety - Adult  Goal: Free from fall injury  Outcome: Progressing     Problem: Confusion  Goal: Confusion, delirium, dementia, or psychosis is improved or at baseline  Description: INTERVENTIONS:  1. Assess for possible contributors to thought disturbance, including medications, impaired vision or hearing, underlying metabolic abnormalities, dehydration, psychiatric diagnoses, and notify attending LIP  2. [place] high risk fall precautions, as indicated  3. Provide frequent short contacts to provide reality reorientation, refocusing and direction  4. Decrease environmental stimuli, including noise as appropriate  5. Monitor and intervene to maintain adequate nutrition, hydration, elimination, sleep and activity  6. If unable to ensure safety without constant attention obtain sitter and review sitter guidelines with assigned personnel  7.  Initiate Psychosocial CNS and Spiritual Care consult, as indicated  Outcome: Progressing

## 2023-01-10 NOTE — PROGRESS NOTES
Hospitalist Progress Note      Synopsis: Patient with a hx of COPD, depression, HLD, HTN, admitted on 1/8/2023 after presenting for chest pain and shortness of breath. Subjective  Feeling better no complaints  No CP or SOB  No fever or chills   No uncontrolled pain  No vomiting or diarrhea   No events reported overnight      Exam:  BP (!) 96/58   Pulse 72   Temp 98.1 °F (36.7 °C)   Resp 18   SpO2 100%   General appearance: No apparent distress, appears stated age and cooperative. HEENT: Conjunctivae/corneas clear. Mucous membranes moist.  Neck: Supple. No JVD. Respiratory: Diminished to auscultation bilaterally. Normal respiratory effort. Cardiovascular:  RRR. S1, S2 without MRG. PV: Pulses palpable. No edema. Abdomen: Soft, non-tender, non-distended. +BS  Musculoskeletal: No obvious deformities. Skin: Normal skin color. No rashes or lesions. Good turgor. Neurologic:  Grossly non-focal. Awake, alert, following commands.    Psychiatric: Alert and oriented, thought content appropriate, normal insight and judgement     Medications:  Reviewed    Infusion Medications    sodium chloride       Scheduled Medications    furosemide  40 mg IntraVENous Daily    sodium chloride flush  5-40 mL IntraVENous 2 times per day    enoxaparin  40 mg SubCUTAneous Daily    ipratropium-albuterol  1 ampule Inhalation Q4H While awake    methylPREDNISolone  40 mg IntraVENous Q12H    calcium elemental  500 mg Oral Daily    clopidogrel  75 mg Oral Daily    diclofenac sodium  2 g Topical BID    levothyroxine  125 mcg Oral Daily    metoprolol tartrate  50 mg Oral BID    pantoprazole  40 mg Oral Daily    pregabalin  50 mg Oral TID    vitamin E  400 Units Oral Daily    budesonide  0.5 mg Nebulization BID    arformoterol tartrate  15 mcg Nebulization BID    aspirin  81 mg Oral Daily    rosuvastatin  40 mg Oral Nightly    miconazole   Topical BID     PRN Meds: sodium chloride flush, sodium chloride, ondansetron **OR** ondansetron, polyethylene glycol, acetaminophen **OR** acetaminophen, hydrALAZINE, melatonin, calcium carbonate, traMADol    I/O  No intake or output data in the 24 hours ending 01/10/23 0819    Labs:   Recent Labs     01/08/23  1153 01/09/23  0407 01/10/23  0630   WBC 7.7 9.8 7.9   HGB 12.6 11.0* 11.7*   HCT 38.7 33.2* 36.6*    285 259         Recent Labs     01/08/23  1153 01/09/23  0407    137   K 3.2* 4.0    103   CO2 26 24   BUN 8 11   CREATININE 1.2 1.1   CALCIUM 9.2 8.1*   PHOS  --  2.7         Recent Labs     01/08/23  1153 01/09/23  0407   PROT 6.8 5.8*   ALKPHOS 126 109   ALT 8 6   AST 13 11   BILITOT 0.6 0.7         No results for input(s): INR in the last 72 hours. No results for input(s): [place] in the last 72 hours. Chronic labs:  Lab Results   Component Value Date    CHOL 187 01/09/2023    TRIG 82 01/09/2023    HDL 40 01/09/2023    LDLCALC 131 (H) 01/09/2023    TSH 48.450 (H) 01/09/2023    INR 1.2 01/30/2018    LABA1C 5.5 01/09/2023       Radiology:  Imaging studies reviewed today.     ASSESSMENT:  Chest pain with   Chronic hypoxic respiratory failure  COPD exacerbation  HLD  HTN  Elevated troponin    PLAN:  Chronic hypoxic respiratory failure continue home oxygen    Chest pain/elevated troponin/abnormal stress test  Stress test-- Abnormal myocardial perfusion imaging with small size mild perfusion defect involving basal anterior wall and basal anterolateral wall suggestive of ischemia-intermediate risk  Cardiology consult    COPD exacerbation  Continue steroids transition to oral  Nebulizers    Acute on chronic diastolic CHF treated IV diuresis, resume oral Lasix strict I/O,monitor renal function  Will need oxygen set up for him prior to discharge    Hypothyroid, continue levothyroxine-elevated TSH-check free T4    Diet: Diet NPO  Code Status: Full Code  PT/OT Eval Status:   as needed  DVT Prophylaxis:   lovenox  Recommended disposition at discharge: Possibly SNF    +++++++++++++++++++++++++++++++++++++++++++++++++  Rockford Saint, MD   [facility].  +++++++++++++++++++++++++++++++++++++++++++++++++  NOTE: This report was transcribed using voice recognition software. Every effort was made to ensure accuracy; however, inadvertent computerized transcription errors may be present.
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
## 2023-01-10 NOTE — PROGRESS NOTES
Occupational Therapy  Attempted OT eval at b/s, however, pt adamantly refused despite much encouragement from Therapist and Nsg staff. Became slightly agitated - no acting out - and requested this therapist leave his room. Pt reporting he wants to return home, declining conversation regarding Sub-acute rehab program stating \"If I can survive [place], I can survive anything. \"  Will attempt OT assessment in the AM.  Thank you for this referral. Ashly Parks, MOT, OTR/L  # [phone]

## 2023-01-10 NOTE — CARE COORDINATION
[facility] return call from [facility]; per [facility] pt has a concentrator at home therefore, not out of oxygen. Should pt require home going oxygen would need to call [facility] at (p) [phone] before 1330 day of discharge and they can deliver a tank for transport.

## 2023-01-10 NOTE — PROGRESS NOTES
Physical Therapy    Physical Therapy Initial Assessment       Name: Martell Avery  : 1950  MRN: [id number]      Date of Service: 1/10/2023    Evaluating PT:  Stu Jimenez PT, DPT  [id number]    Room #:  7984/1090-D  Diagnosis:  Chest pain [R07.9]  Chronic obstructive pulmonary disease with acute exacerbation ([facility] [address] [J44.1]  Chest pain, unspecified type [R07.9]  PMHx/PSHx:   has a past medical history of Alcohol dependence ([facility] [address] Arthritis, Chronic low back pain, COPD (chronic obstructive pulmonary disease) ([facility] [address] Coronary arteriosclerosis, Depression, Hyperlipidemia, Hypertension, Osteomyelitis ([facility] [address] Pneumonia, Rhinitis, Spinal stenosis, lumbar, and Thyroid disease. Procedure/Surgery:    Precautions:  Falls, , O2  Equipment Needs:      SUBJECTIVE:    Pt is a poor historian, per previous PT note,  Pt lives alone in a 1 story home with 4 stairs to enter and 2 rails. Pt ambulated with [facility]. Equipment Owned: SB[employer] - present in room is damaged and unsafe for continued use    OBJECTIVE:   Initial Evaluation  Date: 1/10/23 Treatment Short Term/ Long Term   Goals   AM-PAC 6 Clicks 10/97     Was pt agreeable to Eval/treatment? Yes     Does pt have pain? No c/o pain     Bed Mobility  Rolling: SBA  Supine to sit: SBA  Sit to supine: NT  Scooting: SBA  Rolling: Independent  Supine to sit:  Independent  Sit to supine: Independent  Scooting: Independent     Transfers Sit to stand: Mamadou  Stand to sit: ModA  Stand pivot: ModA [employer]  Sit to stand: supervision  Stand to sit: supervision  Stand pivot: supervision   Ambulation    45 feet with [address] [employer]   300 feet with supervision AAD   Stair negotiation: ascended and descended  NT  >4 steps with single rail supervision   ROM BUE:  Defer to OT  BLE:  WFL     Strength BUE:  Defer to OT  BLE:  4/5  Improve 1 MMT   Balance Sitting EOB:  SBA  Dynamic Standing:  ModA [employer]  Sitting EOB:  Independent    Dynamic Standing:  supervision     Pt is A & O x (self, place) Disoriented to time and situation  Sensation:  WNL  Edema:  WNL      Therapeutic Exercises:  functional mobility    Patient education  Pt educated on role of PT    Patient response to education:   Pt verbalized understanding Pt demonstrated skill Pt requires further education in this area   x x x     ASSESSMENT:    Conditions Requiring Skilled Therapeutic Intervention:    [x]Decreased strength     [x]Decreased ROM  [x]Decreased functional mobility  [x]Decreased balance   [x]Decreased endurance   []Decreased posture  []Decreased sensation  []Decreased coordination   []Decreased vision  [x]Decreased safety awareness   [x]Increased pain       Comments:  Pt agreeable to PT evaluation. Pt performing bed mobility without assist. Pt performing transfers and ambulation with steadying assistance. Pt is cognitively impaired with no attention to his oxygen line. Poor insight into deficits. Poor carryover of cues for [employer] approximation. Severe safety deficits. Patient would benefit from continued skilled PT to maximize functional mobility independence. Treatment:  Patient practiced and was instructed in the following treatment:    Bed mobility- verbal cues to facilitate independence  Functional transfers-Verbal cues for proper positioning and sequencing to perform transfers safely with maximum independence. Gait training- Verbal cues for proper positioning and sequencing using assistive device to maximize functional mobility independence. Pt's/ family goals   1. Get better    Prognosis is good for reaching above PT goals. Patient and or family understand(s) diagnosis, prognosis, and plan of care.   yes    PHYSICAL THERAPY PLAN OF CARE:    PT POC is established based on physician order and patient diagnosis     Referring provider/PT Order:    01/10/23 1445  PT eval and treat  Start:  01/10/23 1445,   End:  01/10/23 1445,   ONE TIME,   Standing Count:  1 Occurrences,   R         Fredo Duarte MD     Diagnosis: Chest pain [R07.9]  Chronic obstructive pulmonary disease with acute exacerbation (HCC) [J44.1]  Chest pain, unspecified type [R07.9]  Specific instructions for next treatment:  Gait training    Current Treatment Recommendations:     [x] Strengthening to improve independence with functional mobility   [x] ROM to improve independence with functional mobility   [x] Balance Training to improve static/dynamic balance and to reduce fall risk  [x] Endurance Training to improve activity tolerance during functional mobility   [x] Transfer Training to improve safety and independence with all functional transfers   [x] Gait Training to improve gait mechanics, endurance and assess need for appropriate assistive device  [x] Stair Training in preparation for safe discharge home and/or into the community   [x] Positioning to prevent skin breakdown and contractures  [x] Safety and Education Training   [x] Patient/Caregiver Education   [x] HEP  [] Other     PT long term treatment goals are located in above grid    Frequency of treatments: 2-5x/week x 1-2 weeks. Time in  1449  Time out  1509    Total Treatment Time  8 minutes     Evaluation Time includes thorough review of current medical information, gathering information on past medical history/social history and prior level of function, completion of standardized testing/informal observation of tasks, assessment of data and education on plan of care and goals.     CPT codes:  [x] Low Complexity PT evaluation 02593  [] Moderate Complexity PT evaluation 84598  [] High Complexity PT evaluation 48984  [] PT Re-evaluation 35032  [] Gait training 13299 0 minutes  [] Manual therapy 91340 0 minutes  [x] Therapeutic activities 69486 8 minutes  [] Therapeutic exercises 11044 0 minutes  [] Neuromuscular reeducation 75222 0 minutes       Karin Manrique PT, DPT   [id number]

## 2023-01-10 NOTE — CARE COORDINATION
This CM reviewed pt's chart, spoke with Kym Naqvi NP with cardiology regarding pt and son Babatunde Valera whom reports being pt's HC/POA and provided this CM contact information; Kym Naqvi also relayed pt has left this facility AMA x4 within the last year. This CM notified [facility] transfer line (p) [phone] via VM with this CM's contact information and request regarding if pt is service connected, if so for what, and at what percentage; awaiting call back at time of review. This CM also reached out to pt's son Babatunde Valera (d/t pt currently A&Ox2) (p) [phone] whom relayed that pt has been confused; expressed concern that pt should not be driving d/t confusion (relayed they sold his car and pt purchased another from someone in is apartment building); [facility] has taken his oxygen back d/t non-compliance regarding not smoking and can not make follow-up appointments d/t ongoing confusion; pt unable to obtain groceries (which at one time niece Necia Brunner was shopping for him however, no longer can assist); and unable to care for self in general.  Babatunde Valera is requesting permanent placement for pt under his [facility] benefits; reports pt is permanent and totally service connected at 100%; no preference of facility; list provided by this CM via secure e-mail [email or web address]. This CM also checked with Amanda Peoples at [facility] regarding guardianship (p) [phone]; Tricia stated d/t participating HC/POA guardianship would be rejected and to proceed under HC/POA. Referral made to [facility] in admissions for [facility] via  with request of call back to this CM. Discharge plan is undetermined at this time. The Plan for Transition of Care is related to the following treatment goals: safe discharge plan    The Patient representative Babatunde Valera son  was provided with a choice of provider and agrees   with the discharge plan.  [x] Yes [] No    Freedom of choice list was provided with basic dialogue that supports the patient's individualized plan of care/goals, treatment preferences and shares the quality data associated with the providers.  [x] Yes [] No

## 2023-01-10 NOTE — CARE COORDINATION
Received message from Ryan Rojas with [facility]; may be able to offer [facility] in [place] pending if pt meets criteria; therapy orders obtained; stat initial evals called to Maryann Chadwick at  via  for placement.

## 2023-01-10 NOTE — PROGRESS NOTES
Messaged Cardiology Re: new consult- Sang Graft NP covering w/Shay.      Dr. Rudy Cox and Khurram Gilbert on today per Pamela Ayala NP.

## 2023-01-11 LAB
ALBUMIN SERPL-MCNC: 3.2 G/DL (ref 3.5–5.2)
ALP BLD-CCNC: 112 U/L (ref 40–129)
ALT SERPL-CCNC: 7 U/L (ref 0–40)
ANION GAP SERPL CALCULATED.3IONS-SCNC: 7 MMOL/L (ref 7–16)
AST SERPL-CCNC: 12 U/L (ref 0–39)
BILIRUB SERPL-MCNC: 0.3 MG/DL (ref 0–1.2)
BUN BLDV-MCNC: 18 MG/DL (ref 6–23)
CALCIUM SERPL-MCNC: 8.4 MG/DL (ref 8.6–10.2)
CHLORIDE BLD-SCNC: 102 MMOL/L (ref 98–107)
CO2: 27 MMOL/L (ref 22–29)
CREAT SERPL-MCNC: 1.1 MG/DL (ref 0.7–1.2)
EKG ATRIAL RATE: 52 BPM
EKG ATRIAL RATE: 72 BPM
EKG P AXIS: 62 DEGREES
EKG P-R INTERVAL: 196 MS
EKG P-R INTERVAL: 204 MS
EKG Q-T INTERVAL: 440 MS
EKG Q-T INTERVAL: 442 MS
EKG QRS DURATION: 106 MS
EKG QRS DURATION: 92 MS
EKG QTC CALCULATION (BAZETT): 409 MS
EKG QTC CALCULATION (BAZETT): 483 MS
EKG R AXIS: -40 DEGREES
EKG R AXIS: -48 DEGREES
EKG T AXIS: -41 DEGREES
EKG T AXIS: 72 DEGREES
EKG VENTRICULAR RATE: 52 BPM
EKG VENTRICULAR RATE: 72 BPM
GFR SERPL CREATININE-BSD FRML MDRD: >60 ML/MIN/1.73
GLUCOSE BLD-MCNC: 119 MG/DL (ref 74–99)
HCT VFR BLD CALC: 34.9 % (ref 37–54)
HEMOGLOBIN: 11.2 G/DL (ref 12.5–16.5)
MCH RBC QN AUTO: 31.5 PG (ref 26–35)
MCHC RBC AUTO-ENTMCNC: 32.1 % (ref 32–34.5)
MCV RBC AUTO: 98 FL (ref 80–99.9)
PDW BLD-RTO: 14 FL (ref 11.5–15)
PLATELET # BLD: 251 E9/L (ref 130–450)
PMV BLD AUTO: 10.8 FL (ref 7–12)
POTASSIUM SERPL-SCNC: 4.4 MMOL/L (ref 3.5–5)
RBC # BLD: 3.56 E12/L (ref 3.8–5.8)
SODIUM BLD-SCNC: 136 MMOL/L (ref 132–146)
TOTAL PROTEIN: 5.9 G/DL (ref 6.4–8.3)
WBC # BLD: 9.7 E9/L (ref 4.5–11.5)

## 2023-01-11 PROCEDURE — 80053 COMPREHEN METABOLIC PANEL: CPT

## 2023-01-11 PROCEDURE — 2700000000 HC OXYGEN THERAPY PER DAY

## 2023-01-11 PROCEDURE — 6370000000 HC RX 637 (ALT 250 FOR IP): Performed by: INTERNAL MEDICINE

## 2023-01-11 PROCEDURE — 93005 ELECTROCARDIOGRAM TRACING: CPT | Performed by: INTERNAL MEDICINE

## 2023-01-11 PROCEDURE — 6360000002 HC RX W HCPCS: Performed by: INTERNAL MEDICINE

## 2023-01-11 PROCEDURE — 99232 SBSQ HOSP IP/OBS MODERATE 35: CPT | Performed by: INTERNAL MEDICINE

## 2023-01-11 PROCEDURE — 93010 ELECTROCARDIOGRAM REPORT: CPT | Performed by: INTERNAL MEDICINE

## 2023-01-11 PROCEDURE — 2060000000 HC ICU INTERMEDIATE R&B

## 2023-01-11 PROCEDURE — 36415 COLL VENOUS BLD VENIPUNCTURE: CPT

## 2023-01-11 PROCEDURE — 94640 AIRWAY INHALATION TREATMENT: CPT

## 2023-01-11 PROCEDURE — 2580000003 HC RX 258: Performed by: INTERNAL MEDICINE

## 2023-01-11 PROCEDURE — 97535 SELF CARE MNGMENT TRAINING: CPT

## 2023-01-11 PROCEDURE — 85027 COMPLETE CBC AUTOMATED: CPT

## 2023-01-11 PROCEDURE — 97165 OT EVAL LOW COMPLEX 30 MIN: CPT

## 2023-01-11 RX ORDER — ASPIRIN 81 MG/1
81 TABLET, CHEWABLE ORAL DAILY
Qty: 30 TABLET | Refills: 3 | Status: SHIPPED | OUTPATIENT
Start: 2023-01-12

## 2023-01-11 RX ORDER — FUROSEMIDE 20 MG/1
20 TABLET ORAL DAILY
Qty: 60 TABLET | Refills: 3 | Status: SHIPPED | OUTPATIENT
Start: 2023-01-12

## 2023-01-11 RX ORDER — PREDNISONE 20 MG/1
20 TABLET ORAL 2 TIMES DAILY
Qty: 6 TABLET | Refills: 0 | Status: SHIPPED | OUTPATIENT
Start: 2023-01-11 | End: 2023-01-14

## 2023-01-11 RX ORDER — LEVOTHYROXINE SODIUM 0.12 MG/1
125 TABLET ORAL DAILY
Qty: 30 TABLET | Refills: 3 | Status: SHIPPED | OUTPATIENT
Start: 2023-01-12

## 2023-01-11 RX ORDER — FUROSEMIDE 20 MG/1
20 TABLET ORAL DAILY
Status: DISCONTINUED | OUTPATIENT
Start: 2023-01-11 | End: 2023-01-12 | Stop reason: HOSPADM

## 2023-01-11 RX ORDER — RANOLAZINE 500 MG/1
500 TABLET, EXTENDED RELEASE ORAL 2 TIMES DAILY
Qty: 60 TABLET | Refills: 3 | Status: SHIPPED | OUTPATIENT
Start: 2023-01-11

## 2023-01-11 RX ADMIN — IPRATROPIUM BROMIDE AND ALBUTEROL SULFATE 1 AMPULE: .5; 2.5 SOLUTION RESPIRATORY (INHALATION) at 12:04

## 2023-01-11 RX ADMIN — Medication 10 ML: at 01:38

## 2023-01-11 RX ADMIN — IPRATROPIUM BROMIDE AND ALBUTEROL SULFATE 1 AMPULE: .5; 2.5 SOLUTION RESPIRATORY (INHALATION) at 08:11

## 2023-01-11 RX ADMIN — IPRATROPIUM BROMIDE AND ALBUTEROL SULFATE 1 AMPULE: .5; 2.5 SOLUTION RESPIRATORY (INHALATION) at 15:28

## 2023-01-11 RX ADMIN — PREGABALIN 50 MG: 25 CAPSULE ORAL at 08:13

## 2023-01-11 RX ADMIN — PREGABALIN 50 MG: 25 CAPSULE ORAL at 20:21

## 2023-01-11 RX ADMIN — METOPROLOL TARTRATE 25 MG: 25 TABLET, FILM COATED ORAL at 20:21

## 2023-01-11 RX ADMIN — PREDNISONE 20 MG: 20 TABLET ORAL at 20:21

## 2023-01-11 RX ADMIN — CLOPIDOGREL BISULFATE 75 MG: 75 TABLET ORAL at 08:12

## 2023-01-11 RX ADMIN — ASPIRIN 81 MG CHEWABLE TABLET 81 MG: 81 TABLET CHEWABLE at 08:13

## 2023-01-11 RX ADMIN — ARFORMOTEROL TARTRATE 15 MCG: 15 SOLUTION RESPIRATORY (INHALATION) at 19:14

## 2023-01-11 RX ADMIN — RANOLAZINE 500 MG: 500 TABLET, EXTENDED RELEASE ORAL at 08:12

## 2023-01-11 RX ADMIN — BUDESONIDE 500 MCG: 0.5 SUSPENSION RESPIRATORY (INHALATION) at 08:11

## 2023-01-11 RX ADMIN — Medication 10 ML: at 08:15

## 2023-01-11 RX ADMIN — CALCIUM 500 MG: 500 TABLET ORAL at 08:13

## 2023-01-11 RX ADMIN — LEVOTHYROXINE SODIUM 125 MCG: 25 TABLET ORAL at 06:20

## 2023-01-11 RX ADMIN — ENOXAPARIN SODIUM 40 MG: 100 INJECTION SUBCUTANEOUS at 08:14

## 2023-01-11 RX ADMIN — BUDESONIDE 500 MCG: 0.5 SUSPENSION RESPIRATORY (INHALATION) at 19:13

## 2023-01-11 RX ADMIN — PANTOPRAZOLE SODIUM 40 MG: 40 TABLET, DELAYED RELEASE ORAL at 08:14

## 2023-01-11 RX ADMIN — Medication 400 UNITS: at 08:13

## 2023-01-11 RX ADMIN — PREDNISONE 20 MG: 20 TABLET ORAL at 08:12

## 2023-01-11 RX ADMIN — ROSUVASTATIN CALCIUM 40 MG: 20 TABLET, FILM COATED ORAL at 20:21

## 2023-01-11 RX ADMIN — ARFORMOTEROL TARTRATE 15 MCG: 15 SOLUTION RESPIRATORY (INHALATION) at 08:11

## 2023-01-11 RX ADMIN — METOPROLOL TARTRATE 25 MG: 25 TABLET, FILM COATED ORAL at 08:13

## 2023-01-11 RX ADMIN — PANTOPRAZOLE SODIUM 40 MG: 40 TABLET, DELAYED RELEASE ORAL at 08:12

## 2023-01-11 RX ADMIN — FUROSEMIDE 20 MG: 40 TABLET ORAL at 08:12

## 2023-01-11 RX ADMIN — RANOLAZINE 500 MG: 500 TABLET, EXTENDED RELEASE ORAL at 20:21

## 2023-01-11 RX ADMIN — PREGABALIN 50 MG: 25 CAPSULE ORAL at 14:56

## 2023-01-11 RX ADMIN — MICONAZOLE NITRATE: 20 CREAM TOPICAL at 20:22

## 2023-01-11 RX ADMIN — DICLOFENAC SODIUM 2 G: 10 GEL TOPICAL at 20:21

## 2023-01-11 RX ADMIN — Medication 10 ML: at 20:21

## 2023-01-11 RX ADMIN — IPRATROPIUM BROMIDE AND ALBUTEROL SULFATE 1 AMPULE: .5; 2.5 SOLUTION RESPIRATORY (INHALATION) at 19:14

## 2023-01-11 NOTE — PROGRESS NOTES
Hospitalist Progress Note      Synopsis: Patient with a hx of COPD, depression, HLD, HTN, admitted on 1/8/2023 after presenting for chest pain and shortness of breath. Subjective  Feeling better no complaints-- \"feel like I can jump rope. \" Want to go home, refuses SNF  No CP or SOB  No fever or chills   No uncontrolled pain  No vomiting or diarrhea   No events reported overnight      Exam:  /76   Pulse 68   Temp 97.4 °F (36.3 °C) (Temporal)   Resp 18   SpO2 99%   General appearance: No apparent distress, appears stated age and cooperative. Slow in reponse but oriented x4  HEENT: Conjunctivae/corneas clear. Mucous membranes moist.  Neck: Supple. No JVD. Respiratory: Diminished to auscultation bilaterally. Normal respiratory effort. Cardiovascular:  RRR. S1, S2 without MRG. PV: Pulses palpable. No edema. Abdomen: Soft, non-tender, non-distended. +BS  Musculoskeletal: No obvious deformities. Skin: Normal skin color. No rashes or lesions. Good turgor. Neurologic:  Grossly non-focal. Awake, alert, following commands.    Psychiatric: Alert and oriented, thought content appropriate, normal insight and judgement     Medications:  Reviewed    Infusion Medications    sodium chloride       Scheduled Medications    furosemide  20 mg Oral Daily    predniSONE  20 mg Oral BID    ranolazine  500 mg Oral BID    metoprolol tartrate  25 mg Oral BID    sodium chloride flush  5-40 mL IntraVENous 2 times per day    enoxaparin  40 mg SubCUTAneous Daily    ipratropium-albuterol  1 ampule Inhalation Q4H While awake    calcium elemental  500 mg Oral Daily    clopidogrel  75 mg Oral Daily    diclofenac sodium  2 g Topical BID    levothyroxine  125 mcg Oral Daily    pantoprazole  40 mg Oral Daily    pregabalin  50 mg Oral TID    vitamin E  400 Units Oral Daily    budesonide  0.5 mg Nebulization BID    arformoterol tartrate  15 mcg Nebulization BID    aspirin  81 mg Oral Daily    rosuvastatin  40 mg Oral Nightly miconazole   Topical BID     PRN Meds: perflutren lipid microspheres, sodium chloride flush, sodium chloride, ondansetron **OR** ondansetron, polyethylene glycol, acetaminophen **OR** acetaminophen, hydrALAZINE, melatonin, calcium carbonate, traMADol    I/O    Intake/Output Summary (Last 24 hours) at 1/11/2023 0859  Last data filed at 1/10/2023 2344  Gross per 24 hour   Intake 240 ml   Output 1600 ml   Net -1360 ml       Labs:   Recent Labs     01/09/23  0407 01/10/23  0630 01/11/23  0653   WBC 9.8 7.9 9.7   HGB 11.0* 11.7* 11.2*   HCT 33.2* 36.6* 34.9*    259 251         Recent Labs     01/09/23  0407 01/10/23  0630 01/11/23  0653    138 136   K 4.0 4.3 4.4    104 102   CO2 24 23 27   BUN 11 14 18   CREATININE 1.1 1.1 1.1   CALCIUM 8.1* 8.7 8.4*   PHOS 2.7  --   --          Recent Labs     01/09/23  0407 01/10/23  0630 01/11/23  0653   PROT 5.8* 6.1* 5.9*   ALKPHOS 109 113 112   ALT 6 7 7   AST 11 13 12   BILITOT 0.7 0.5 0.3         No results for input(s): INR in the last 72 hours. No results for input(s): Roxy [place] in the last 72 hours. Chronic labs:  Lab Results   Component Value Date    CHOL 187 01/09/2023    TRIG 82 01/09/2023    HDL 40 01/09/2023    LDLCALC 131 (H) 01/09/2023    TSH 48.450 (H) 01/09/2023    INR 1.2 01/30/2018    LABA1C 5.5 01/09/2023       Radiology:  Imaging studies reviewed today.     ASSESSMENT:  Chest pain with positive stress test and elevated troponin  Chronic hypoxic respiratory failure 4L  COPD exacerbation  HLD  HTN  Elevated troponin  Acute on chronic diastolic CHF    PLAN:  Chronic hypoxic respiratory failure continue home oxygen    Chest pain/elevated troponin/abnormal stress test  Stress test-- Abnormal myocardial perfusion imaging with small size mild perfusion defect involving basal anterior wall and basal anterolateral wall suggestive of ischemia-intermediate risk  Cardiology consult    COPD exacerbation  Continue steroids transition to oral  Nebulizers    Acute on chronic diastolic CHF treated IV diuresis, resume oral Lasix strict I/O,monitor renal function  Will need oxygen set up for him prior to discharge    Hypothyroid, continue levothyroxine 125 mcg daily-elevated TSH- free T4 less than 0.10  -Suspect noncompliance with levothyroxine--repeat TSH and free T4    Diet: ADULT DIET; Regular; Low Fat/Low Chol/High Fiber/YESSENIA  Code Status: Full Code  PT/OT Eval Status:   as needed  DVT Prophylaxis:   lovenox  Recommended disposition at discharge: home today    +++++++++++++++++++++++++++++++++++++++++++++++++  Denise Acosta MD   [facility].  +++++++++++++++++++++++++++++++++++++++++++++++++  NOTE: This report was transcribed using voice recognition software. Every effort was made to ensure accuracy; however, inadvertent computerized transcription errors may be present.

## 2023-01-11 NOTE — PROGRESS NOTES
OCCUPATIONAL THERAPY INITIAL EVALUATION    [address] [address] [address]      XUNL:                                                  Patient Name: Louis Pena  MRN: [id number]  : 1950  Room: [address] [place] [address]    Evaluating OT: CARITO Narayan, OTR/L  # [id number]    Referring Provider:  Chris Lilly MD  Specific Provider Orders:  Marge Wood and Treat\"  1-10-23    Diagnosis: Chest pain [R07.9]  Chronic obstructive pulmonary disease with acute exacerbation ([facility] [address] [J44.1]  Chest pain, unspecified type [R07.9]    Pt was admitted w/ Chest pain, Cough, SOB, Hypoxia - Ran out of Oxygen at home - confused, agitated    Pertinent Medical History:  Pt has a past medical history of Alcohol dependence ([facility] [address] Arthritis, Chronic low back pain, COPD (chronic obstructive pulmonary disease) ([facility] [address] Coronary arteriosclerosis, Depression, Hyperlipidemia, Hypertension, Osteomyelitis ([facility] [address] Pneumonia, Rhinitis, Spinal stenosis, lumbar, and Thyroid disease.,  has a past surgical history that includes Coronary artery bypass graft ().     Surgeries this admission: 23: Cardiac Stress Test     Precautions:  Fall Risk  /TSM  Cognition - Bed alarm  Impulsive  4L O2    Assessment of current deficits   [x] Functional mobility  [x]ADLs  [x] Strength               [x]Cognition   [x] Functional transfers   [x] IADLs         [x] Safety Awareness   [x]Endurance   [] Fine Coordination              [x] Balance     [] Vision/perception   []Sensation    []Gross Motor Coordination  [] ROM  [] Delirium                  [] Motor Control       OT PLAN OF CARE   OT POC based on physician orders, patient diagnosis and results of clinical assessment    Frequency/Duration 1-3 days/wk for 2 weeks PRN   Specific OT Treatment to include:     * Instruction/training on adapted ADL techniques and AE recommendations to increase functional independence within precautions       * Training on energy conservation strategies, correct breathing pattern and techniques to improve independence/tolerance for self-care routine  * Functional transfer/mobility training/DME recommendations for increased independence, safety, and fall prevention  * Patient/Family education to increase follow through with safety techniques and functional independence  * Recommendation of environmental modifications for increased safety with functional transfers/mobility and ADLs  * Cognitive retraining/development of therapeutic activities to improve problem solving, judgement, memory, and attention for increased safety/participation in ADL/IADL tasks  * Therapeutic exercise to improve motor endurance, ROM, and functional strength for ADLs/functional transfers  * Therapeutic activities to facilitate/challenge dynamic balance, stand tolerance for increased safety and independence with ADLs  * Therapeutic activities to facilitate gross/fine motor skills for increased independence with ADLs  * Neuro-muscular re-education: facilitation of righting/equilibrium reactions  * Positioning to improve skin integrity, interaction with environment and functional independence  * Delirium prevention/treatment  * Manual techniques for edema management  Other:    Recommended Adaptive Equipment: TBD as pt progresses - [employer], Adaptive equipment      Home Living:  Pt lives alone in a single-level apartment. Bathroom setup:  Walk-in-Shower, Standard-height Commode   Equipment owned:  Erlin, Shower Chair, 4L Home O2    Available Family Assist:  None reported    Prior Level of Function:  Pt reported being IND with all ADLs, IADLs, Transfers and Mobility using QC vs No AD for ambulation.  Difficulty completing IADLs d/t limited endurance/balance  Driving:  Not reported  Occupation:  Not reported;   of [employer] [place] [employer]    Pain Level:  Denied pain    Additional Complaints:  General weakness, limited endurance;  No SOB but hypoxic - see below    Cognition: A & O x 3 - cues for day, date   Able to Follow Multi-Step Commands w/ Min-Mod VCs   Memory:  fair    Sequencing:  fair    Problem solving:  fair    Judgement/safety:  fair (-) - impulsively sits - unsafely despite Mod VCs  Additional Comments:  Pt presented with a flat affect fair eye contact. Pleasant and cooperative throughout session, little verbalization. At end of session, pt asked \"Did I kick you out of here yesterday? I'm so sorry. When I'm wrong, I say it. \"  Very apologetic and appreciative of assistance this AM    Vitals/Lab Values:  4L O2:  Difficult to consistently obtain Pulse-Ox despite much effort:  Pulse-Ox 100% in semi-supine prior to initiation of ax     PULSE OX ON ROOM AIR SITTING__90%__%  PULSE OX ON ROOM AIR AMBULATING ___83%_%  PULSE OX ON __4__LITERS SITTING RECOVERY __94__%  PULSE OX ON __4__LITERS AMBULATING RECOVERY __92_%       Functional Assessment:  AM-PAC Daily Activity Raw Score: 15/24     Initial Eval Status  Date: 1-11-23   Treatment Status  Date: STGs = LTGs  Time frame: 10-14 days   Feeding IND after set up      NA   Grooming Min A/Set up    Min A for hair care, SUP for all other tasks w/ Min VCs for problem solving/quality of task  Seated at the sink  Unable to tolerate standing at sink for task d/t limited endurance    SUP/Set up  Standing at the [employer] A/Set up    Donning/doffing gown seated in chair, Min VCs to problem solve task    SUP/Set up     LB Dressing Mod A/Set up    Min A to thread LEs into/out of pants, don/doff socks - reaching to floor for ax despite VCs for use of Cross-legged tech  Min A to pull pants over hips w/ Mod A for safety w/ standing balance  Mod VCs/Pt ed for safe/adaptive techs, use of adaptive equip    SUP/Set up     Bathing Mod A/Set up    Min A for UB sponge bathing - Seated  Assist to wash back/buttocks/posterior thighs w/ Mod A for safety w/ standing balance, SUP for LB sponge-bathing otherwise - seated    Pt ed re: safe/adaptive techs, use of Shower chair    SUP/Set up     Toileting Mod A    Able to complete bowel hygiene seated  Min A for clothing adjustment over hips + Mod A for safety w/ standing balance for task  Mod VCs for safety    SUP     Bed Mobility  Supine to sit: SUP   Sit to supine:  SUP     VCs for safety      Supine to sit: IND  Sit to supine: IND     Functional Transfers Min A sit to stand  Mod A stand to sit - very impulsive, \"plops\" despite Mod VCs    EOB 2x, Commode 1x, Chair 3x  Pt ed for safety/hand placement    SUP     Functional Mobility Min A w/ [employer]  Mod VCs for walker safety    Household distances in room Bed<>Bathroom  Pt ed for safety/improved safety awareness, walker safety    SUP     Balance Sitting:     Static:  SUP    Dynamic:  Close SUP w/ functional ax EOB/Commode/Unarmed Chair     Standing:     Static:  Min A w/ [employer]    Dynamic:  Mod A w/ functional ax/mobility w/ [employer]    Sitting:     Static:  IND    Dynamic:  IND w/ functional ax    Standing:     Static:  SUP w/ AD PRN    Dynamic:  SUP w/ functional ax/mobility w/ AD PRN   Activity Tolerance Fair    Limited endurance, Hypoxic    Good(-)   Visual/  Perceptual    Hearing: WFL   Glasses: None at b/s    [facility]   Hearing Aids:  None at b/s               Hand Dominance: Right   AROM Strength Additional Info:    RUE  WFL WNL Good ;   Good(-) FMC/dexterity noted during ADL tasks     LUE WFL WNL Good ;    Good(-) FMC/dexterity noted during ADL tasks       Sensation:  Denies numbness or tingling Marc UEs   Tone: WFL Marc UEs   Edema: None Noted Marc UEs     Comments: Upon arrival, patient was found sleeping in side-lying. He was agreeable to participate in therapeutic ax. No Family present during session. Received permission from RN prior to engaging pt in OT services. Educated pt on role of OT services. At the end of the session, patient was properly positioned in Semi-Supine.   Call light and phone within reach, all lines and tubes intact. Oriented pt to call bell. Made all appropriate Environmental Modifications to facilitate pt's level of IND and safety. All needs met. Bed Alarm activated. TSM/ at b/s         Overall patient demonstrated decreased independence and safety during completion of ADL/functional transfer/mobility tasks. Pt would benefit from continued skilled OT to increase safety and independence with completion of ADL/IADL tasks for functional independence and quality of life. Treatment: OT treatment provided this date includes:   Instruction/training on safety and adapted techniques for completion of ADLs, use of DME/AD/Adaptive equip:    Instruction/training on safe functional mobility/transfer techniques, use of DME/AD:    Instruction/training on energy conservation techs (EC)/Work Simplification/Pursed-Lip Breathing (PLB) for completion of ADLs:     Neuromuscular Reeducation to facilitate balance/righting reactions for increased function with ADLs:    Skilled positioning/alignment for Pain Mgmt, Skin Integrity, Edema Control, to maximize Pt's safety and ability to safely and INDly interact w/ his/her environment, maximize respiratory status  Activity tolerance - Sitting/Standing to improve endurance w/ functional ax   Cognitive retraining -  Oriented pt to current Date, Place and Situation; Cues for safety/safety awareness, sequencing, problem solving    Skilled monitoring of Vitals during session and pt's response to tx ax      Pt/family ed re: benefits of participate in post-acute therapy program;      Consulted RN, SW/CM    Made all appropriate Environmental Modifications to facilitate pt's level of IND and safety. Recommendations for Continued Participation in OT services during Hospitalization and at D/C - SNF    Pt and/or Family verbalized/demonstrated a Good(-) understanding of education provided. Will Review PRN.          Rehab Potential: Good(-) for established goals Patient / Family Goal: Not stated at this time      Patient and/or family were instructed on functional diagnosis, prognosis/goals and OT plan of care. Demonstrated Good understanding. Eval Complexity: Low    Time In: 0843  Time Out: 0936  Total Treatment Time: 38 minutes    Min Units   OT Eval Low 97165  X  1   OT Eval Medium 14569      OT Eval High 74006      OT Re-Eval [id number]       Therapeutic Ex 05058       Therapeutic Activities 60104       ADL/Self Care 17124  38  3   Orthotic Management 33316       Manual 07068     Neuro Re-Ed 00511       Non-Billable Time              Evaluation Time additionally includes thorough review of current medical information, gathering information on past medical history/social history and prior level of function, completion of standardized testing/informal observation of tasks, assessment of data and education on plan of care and goals.             Sarah Benavides, MOT, OTR/L  # [id number]

## 2023-01-11 NOTE — DISCHARGE INSTR - COC
Continuity of Care Form    Patient Name: Lars Lesch   :  1950  MRN:  [id number]    Admit date:  2023  Discharge date:  2023    Code Status Order: Full Code   Advance Directives:     Admitting Physician:  Humberto Rodríguez DO  PCP: Jayden Shabazz MD    Discharging Nurse: [facility] Unit/Room#: 9725/6110-U  Discharging Unit Phone Number: [phone]    Emergency Contact:   Extended Emergency Contact Information  Primary Emergency Contact: [facility]  Address: [address]  Work Phone: [phone]  Mobile Phone: [phone]  Relation: Child  Secondary Emergency Contact: New Ramirez  Address: [address] [place] [address], [address] [place] [address]  Mobile Phone: [phone]  Relation: Child    Past Surgical History:  Past Surgical History:   Procedure Laterality Date    CORONARY ARTERY BYPASS GRAFT         Immunization History: There is no immunization history for the selected administration types on file for this patient. Active Problems:  Patient Active Problem List   Diagnosis Code    CAD (coronary artery disease) I25.10    Hx of CABG Z95.1    Atrial fibrillation (HCC) I48.91    HLD (hyperlipidemia) E78.5    Tobacco abuse Z72.0    Hypoxia R09.02    Stage 4 very severe COPD by GOLD classification ([address] J44.9    COPD exacerbation ([address] J44.1    Chronic respiratory failure with hypoxia ([address] J96.11    Obesity (BMI 30.0-34. 9) E66.9    Leukocytosis D72.829    Lactic acidosis E87.20    Congestive heart failure (HCC) I50.9    HCAP (healthcare-associated pneumonia) J18.9    Flu J11.1    Chest pain R07.9    Abnormal cardiovascular stress test R94.39    Elevated troponin R77.8    NSTEMI (non-ST elevated myocardial infarction) (HCC) I21.4    Severe aortic stenosis I35.0    Abnormal stress test R94.39       Isolation/Infection:   Isolation            No Isolation          Patient Infection Status       Infection Onset Added Last Indicated Last Indicated By Review Planned Expiration Resolved Resolved By    None active    Resolved    COVID-19 (Rule Out) 01/08/23 01/08/23 01/08/23 COVID-19, Rapid (Ordered)   01/08/23 Rule-Out Test Resulted    COVID-19 (Rule Out) 12/16/22 12/16/22 12/16/22 COVID-19 & Influenza Combo (Ordered)   12/16/22 Rule-Out Test Resulted    COVID-19 (Rule Out) 09/23/22 09/23/22 09/23/22 COVID-19, Rapid (Ordered)   09/26/22 Rule-Out Test Canceled            Nurse Assessment:  Last Vital Signs: /76   Pulse 68   Temp 97.4 °F (36.3 °C) (Temporal)   Resp 18   SpO2 99%     Last documented pain score (0-10 scale): Pain Level: 4  Last Weight:   Wt Readings from Last 1 Encounters:   12/20/22 205 lb 0.4 oz (93 kg)     Mental Status:  disoriented and alert    IV Access:  - Peripheral IV - site  {Anatomy; iv placement site:23118}, insertion date: ***    Nursing Mobility/ADLs:  Walking   Independent  Transfer  Independent  Bathing  Assisted  Dressing  Independent  Toileting  Independent  Feeding  [address]  Independent  Med Delivery   whole    Wound Care Documentation and Therapy:        Elimination:  Continence: Bowel:yes  Bladder: Yes  Urinary Catheter: None   Colostomy/Ileostomy/Ileal Conduit: No       Date of Last BM: 01/12/2023    Intake/Output Summary (Last 24 hours) at 1/11/2023 1133  Last data filed at 1/10/2023 2344  Gross per 24 hour   Intake 240 ml   Output 1600 ml   Net -1360 ml     I/O last 3 completed shifts: In: 240 [P.O.:240]  Out: 1900 [Urine:1900]    Safety Concerns: At Risk for Falls    Impairments/Disabilities:      Speech    Nutrition Therapy:  Current Nutrition Therapy:   - Oral Diet:  General    Routes of Feeding: Oral  Liquids:  Thin Liquids  Daily Fluid Restriction: no  Last Modified Barium Swallow with Video (Video Swallowing Test): not done    Treatments at the Time of Hospital Discharge:   Respiratory Treatments: ***  Oxygen Therapy:  {Therapy; copd oxygen:25341}  Ventilator:    - No ventilator support    Rehab Therapies: Physical Therapy and Occupational Therapy  Weight Bearing Status/Restrictions: No weight bearing restrictions  Other Medical Equipment (for information only, NOT a DME order):  {EQUIPMENT:348975126}  Other Treatments: ***    Patient's personal belongings (please select all that are sent with patient):  {[facility] DME Belongings:270599176}    RN SIGNATURE:  {Esignature:706767511}    CASE MANAGEMENT/SOCIAL WORK SECTION    Inpatient Status Date: ***    Readmission Risk Assessment Score:  Readmission Risk              Risk of Unplanned Readmission:  24           Discharging to Facility/ Agency   Name: [facility]  Address:[address] [place] [address]. [address]    Dialysis Facility (if applicable)   Name:  Address:  Dialysis Schedule:  Phone:  Fax:    / signature: Electronically signed by Baltazar Guy RN on 1/11/23 at 11:34 AM EST    PHYSICIAN SECTION    Prognosis: {Prognosis:9205501492}    Condition at Discharge: [facility] Patient Condition:790642381}    Rehab Potential (if transferring to Rehab): {Prognosis:4559200148}    Recommended Labs or Other Treatments After Discharge: ***    Physician Certification: I certify the above information and transfer of Tylene Liner  is necessary for the continuing treatment of the diagnosis listed and that he requires {Admit to Appropriate Level of Care:66470} for {GREATER/LESS:431981090} 30 days.      Update Admission H&P: {CHP DME Changes in JAZDD:498823626}    PHYSICIAN SIGNATURE:  {Esignature:879704271}

## 2023-01-11 NOTE — CARE COORDINATION
Stat OT initial eval called to [phone] via VM; OT is on the unit to re-attempt eval now. This CM received message after hours from [facility] with [facility], their DON is reviewing for acceptance. Spoke with Lacey Brady liaison with [facility]; relayed facility spoke with pt's son (unsure which son) whom relayed looking for [facility] facilities. This CM continues to await return e-mail from pt's son Jeramy Olmstead with POA/HC; will speak with Jeramy Olmstead after morning rounds to clarify; per night shift RN pt is to be discharged. This CM left VM for Jeramy Olmstead via (p) [phone] with request for call back. OT has seen; received message from [facility] that her DON is currently in a class and unavailable, she will update this CM once she is able to discuss with DON. Visited pt at bedside provided update; pt remains confused however, pleasant and asking when he can go and, if he is going to [facility]. Lacey Brady with [facility] did relay that they can take pt under Medicare and then, facility can handle submitting through [place] for co-pay. Discharge plan remains undetermined at this time.

## 2023-01-11 NOTE — CARE COORDINATION
Spoke with pt's son Anival Guzman relayed need for HC/POA paperwork today; Manijeanna Thomas verbalized understanding and stated he can locate today and send. Discussed available options [facility] and [facility], explained that [facility] has not yet accepted; Anival Guzman to review both facilities and call this CM back; Received message from [facility] y[facility] they are unable to accept. Received return three way call from Anival Guzman and Leora, pt's sons were updated and agreeable for [facility] in [place]; this CM updated Lisa Tolbert; will need to be 24 hours sitter free; sitter removed at 1030 am this morning; 7000, ambulance and envelope completed in soft-chart; will need rapid covid at discharge.

## 2023-01-11 NOTE — PLAN OF CARE
Patient's chart updated to reflect:      . - HF care plan, HF education points and HF discharge instructions.  -Orders: 2 gram sodium diet, daily weights, I/O.  -PCP and/or Cardiologist appointment to be scheduled within 7 days of hospital discharge.  -History of HF, not primary admission Dx. Patient admitted for treatment of Impression:   Elevated hs-cTnT: 100-85 ng/L. Possible NSTEMI  Abnormal stress test  Atypical, chronic chest pain.   Patient currently denies  General confusion  Kenna Crain RN RN, BSN  Heart Failure Navigator

## 2023-01-11 NOTE — PLAN OF CARE
Problem: Discharge Planning  Goal: Discharge to home or other facility with appropriate resources  Outcome: Progressing     Problem: Risk for Elopement  Goal: Patient will not exit the unit/facility without proper excort  Outcome: Progressing     Problem: Safety - Adult  Goal: Free from fall injury  Outcome: Progressing     Problem: Confusion  Goal: Confusion, delirium, dementia, or psychosis is improved or at baseline  Description: INTERVENTIONS:  1. Assess for possible contributors to thought disturbance, including medications, impaired vision or hearing, underlying metabolic abnormalities, dehydration, psychiatric diagnoses, and notify attending LIP  2. [place] high risk fall precautions, as indicated  3. Provide frequent short contacts to provide reality reorientation, refocusing and direction  4. Decrease environmental stimuli, including noise as appropriate  5. Monitor and intervene to maintain adequate nutrition, hydration, elimination, sleep and activity  6. If unable to ensure safety without constant attention obtain sitter and review sitter guidelines with assigned personnel  7. Initiate Psychosocial CNS and Spiritual Care consult, as indicated  Outcome: Progressing     Problem: Chronic Conditions and Co-morbidities  Goal: Patient's chronic conditions and co-morbidity symptoms are monitored and maintained or improved  Outcome: Progressing     Problem: Skin/Tissue Integrity  Goal: Absence of new skin breakdown  Description: 1. Monitor for areas of redness and/or skin breakdown  2. Assess vascular access sites hourly  3. Every 4-6 hours minimum:  Change oxygen saturation probe site  4. Every 4-6 hours:  If on nasal continuous positive airway pressure, respiratory therapy assess nares and determine need for appliance change or resting period.   Outcome: Progressing

## 2023-01-11 NOTE — PROGRESS NOTES
-- DO NOT REPLY / DO NOT REPLY ALL --  -- Message is from the [facility]--    Result Request  Type of Test / Lab: DEXASCAN AXIAL   Date Test / Lab: 05/24/2022  Location: [facility]  Test / Lab ordered/authorized by: Outside order    Other comments:Patient will like for primary care doctor to receive fax with results.    Preferred Delivery Method   Fax - number to send to: [phone]    Caller Information       Type Contact Phone    06/03/2022 01:39 PM CDT Phone (Incoming) Ramakrishna Prakash (Self) [phone] (M)          Alternative phone number: none    Turnaround time given to caller:   \"This message will be sent to [state Provider's name]. The clinical team will fulfill your request as soon as they review your message.\"   INPATIENT CARDIOLOGY FOLLOW-UP    Name: Tex Montenegro    Age: 71 y.o. Date of Admission: 1/8/2023 11:29 AM    Date of Service: 1/11/2023    [facility]    Chief Complaint: Follow-up for abnormal stress test, CAD, AAS    Interim History:  Remains a bedside sitter. Appears calm. Denies chest pain shortness of breath, wants to be discharged home. Review of Systems:   Negative except as described above    Problem List:  Patient Active Problem List   Diagnosis    CAD (coronary artery disease)    Hx of CABG    Atrial fibrillation (HCC)    HLD (hyperlipidemia)    Tobacco abuse    Hypoxia    Stage 4 very severe COPD by GOLD classification ([facility]    COPD exacerbation ([facility]    Chronic respiratory failure with hypoxia (HCC)    Obesity (BMI 30.0-34. 9)    Leukocytosis    Lactic acidosis    Congestive heart failure (HCC)    HCAP (healthcare-associated pneumonia)    Flu    Chest pain    Abnormal cardiovascular stress test    Elevated troponin    NSTEMI (non-ST elevated myocardial infarction) (HCC)    Severe aortic stenosis    Abnormal stress test       Current Medications:    Current Facility-Administered Medications:     furosemide (LASIX) tablet 20 mg, 20 mg, Oral, Daily, Elvis Page MD, 20 mg at 01/11/23 8825    predniSONE (DELTASONE) tablet 20 mg, 20 mg, Oral, BID, Ridge Ramirez MD, 20 mg at 01/11/23 0038    perflutren lipid microspheres (DEFINITY) injection 1.5 mL, 1.5 mL, IntraVENous, ONCE PRN, Elvis Page MD    ranolazine [facility]) extended release tablet 500 mg, 500 mg, Oral, BID, Elvis Page MD, 500 mg at 01/11/23 7072    metoprolol tartrate (LOPRESSOR) tablet 25 mg, 25 mg, Oral, BID, Elvis Page MD, 25 mg at 01/11/23 0813    sodium chloride flush 0.9 % injection 5-40 mL, 5-40 mL, IntraVENous, 2 times per day, Brett Valenzuela DO, 10 mL at 01/11/23 0815    sodium chloride flush 0.9 % injection 5-40 mL, 5-40 mL, IntraVENous, PRN, Brett Valenzuela DO    0.9 % sodium chloride infusion, , IntraVENous, PRN, Rhina Valenzuela DO    enoxaparin (LOVENOX) injection 40 mg, 40 mg, SubCUTAneous, Daily, Aria Valenzuela DO, 40 mg at 01/11/23 0814    ondansetron (ZOFRAN-ODT) disintegrating tablet 4 mg, 4 mg, Oral, Q8H PRN **OR** ondansetron (ZOFRAN) injection 4 mg, 4 mg, IntraVENous, Q6H PRN, Abel Valenzuela DO    polyethylene glycol (GLYCOLAX) packet 17 g, 17 g, Oral, Daily PRN, Denise Rios DO    acetaminophen (TYLENOL) tablet 650 mg, 650 mg, Oral, Q6H PRN **OR** acetaminophen (TYLENOL) suppository 650 mg, 650 mg, Rectal, Q6H PRN, Rhina Valenzuela DO    hydrALAZINE (APRESOLINE) injection 10 mg, 10 mg, IntraVENous, Q6H PRN, Abel Valenzuela DO    melatonin tablet 3 mg, 3 mg, Oral, Nightly PRN, Aria Valenzuela DO    calcium carbonate (TUMS) chewable tablet 500 mg, 500 mg, Oral, TID PRN, Rhina Valenzuela DO    ipratropium-albuterol (DUONEB) nebulizer solution 1 ampule, 1 ampule, Inhalation, Q4H While awake, Abel Valenzuela DO, 1 ampule at 01/11/23 5303    calcium elemental (OSCAL) tablet 500 mg, 500 mg, Oral, Daily, Aria Valenzuela DO, 500 mg at 01/11/23 0813    clopidogrel (PLAVIX) tablet 75 mg, 75 mg, Oral, Daily, Aria Valenzuela DO, 75 mg at 01/11/23 7789    diclofenac sodium (VOLTAREN) 1 % gel 2 g, 2 g, Topical, BID, Abel Valenzuela DO    levothyroxine (SYNTHROID) tablet 125 mcg, 125 mcg, Oral, Daily, Aria Valenzuela DO, 125 mcg at 01/11/23 3238    pantoprazole (PROTONIX) tablet 40 mg, 40 mg, Oral, Daily, Aria Valenzuela DO, 40 mg at 01/11/23 8800    pregabalin (LYRICA) capsule 50 mg, 50 mg, Oral, TID, Aria Valenzuela DO, 50 mg at 01/11/23 0813    traMADol (ULTRAM) tablet 50 mg, 50 mg, Oral, Q6H PRN, [place], DO    vitamin E capsule 400 Units, 400 Units, Oral, Daily, Aria Valenzuela DO, 400 Units at 01/11/23 0813    budesonide (PULMICORT) nebulizer suspension 500 mcg, 0.5 mg, Nebulization, BID, Abel Valenzuela DO, 500 mcg at 01/11/23 0811    arformoterol tartrate (BROVANA) nebulizer solution 15 mcg, 15 mcg, Nebulization, BID, Aria Valenzuela DO, 15 mcg at 01/11/23 4780    aspirin chewable tablet 81 mg, 81 mg, Oral, Daily, Aria Valenzuela DO, 81 mg at 01/11/23 0813    rosuvastatin (CRESTOR) tablet 40 mg, 40 mg, Oral, Nightly, Aria Valenzuela DO, 40 mg at 01/10/23 2345    miconazole (MICOTIN) 2 % cream, , Topical, BID, Liana Lanier DO    Physical Exam:  /76   Pulse 68   Temp 97.4 °F (36.3 °C) (Temporal)   Resp 18   SpO2 99%   Wt Readings from Last 3 Encounters:   12/20/22 205 lb 0.4 oz (93 kg)   09/23/22 230 lb (104.3 kg)   06/30/20 228 lb 9.6 oz (103.7 kg)     Appearance: Awake, alert, no acute respiratory distress  Skin: Intact, no rash  Head: Normocephalic, atraumatic  Eyes: EOMI, no conjunctival erythema  ENMT: No pharyngeal erythema, MMM, no rhinorrhea  Neck: Supple, no elevated JVP, no carotid bruits  Lungs: Clear to auscultation bilaterally. No wheezes, rales, or rhonchi. Cardiac: PMI nondisplaced, Regular rhythm with a normal rate, S1 & S2 normal, 3/6 late peaking systolic murmur right upper sternal border S2 somewhat muffled  Abdomen: Soft, nontender, +bowel sounds  Extremities: Moves all extremities x 4, no lower extremity edema  Neurologic: No focal motor deficits apparent, normal mood and affect  Peripheral Pulses: Intact posterior tibial pulses bilaterally    Intake/Output:    Intake/Output Summary (Last 24 hours) at 1/11/2023 1101  Last data filed at 1/10/2023 2344  Gross per 24 hour   Intake 240 ml   Output 1600 ml   Net -1360 ml     No intake/output data recorded.     Laboratory Tests:  Recent Labs     01/09/23  0407 01/10/23  0630 01/11/23  0653    138 136   K 4.0 4.3 4.4    104 102   CO2 24 23 27   BUN 11 14 18   CREATININE 1.1 1.1 1.1   GLUCOSE 120* 120* 119*   CALCIUM 8.1* 8.7 8.4*     Lab Results   Component Value Date/Time    MG 1.9 01/09/2023 04:07 AM     Recent Labs 23  0407 01/10/23  0630 23  0653   ALKPHOS 109 113 112   ALT 6 7 7   AST 11 13 12   PROT 5.8* 6.1* 5.9*   BILITOT 0.7 0.5 0.3   LABALBU 3.0* 3.3* 3.2*     Recent Labs     23  0407 01/10/23  0630 23  0653   WBC 9.8 7.9 9.7   RBC 3.48* 3.73* 3.56*   HGB 11.0* 11.7* 11.2*   HCT 33.2* 36.6* 34.9*   MCV 95.4 98.1 98.0   MCH 31.6 31.4 31.5   MCHC 33.1 32.0 32.1   RDW 13.7 13.8 14.0    259 251   MPV 10.3 10.8 10.8     Lab Results   Component Value Date    CKTOTAL 148 10/27/2017    CKMB 6.5 10/27/2017    TROPONINI 0.02 2018    TROPONINI <0.01 2018    TROPONINI <0.01 2018     Lab Results   Component Value Date    INR 1.2 2018    INR 1.2 2018    PROTIME 13.2 (H) 2018    PROTIME 12.8 (H) 2018     Lab Results   Component Value Date    TSH 48.450 (H) 2023     Lab Results   Component Value Date    LABA1C 5.5 2023     No results found for: EAG  Lab Results   Component Value Date    CHOL 187 2023     Lab Results   Component Value Date    TRIG 82 2023     Lab Results   Component Value Date    HDL 40 2023     Lab Results   Component Value Date    LDLCALC 131 (H) 2023     Lab Results   Component Value Date    LABVLDL 16 2023     No results found for: CHOLHDLRATIO  Recent Labs     23  1153   PROBNP 3,835*       Cardiac Tests:    EK2023: Sinus rhythm 61 bpm, blocked PACs. Left axis. Nonspecific ST-T wave changes    1/10/2023: Sinus rhythm with frequent PACs and PVCs. Left axis. Nonspecific T wave changes    Telemetry: Sinus rhythm 60s with PACs and PVCs    Chest X-ray:   23    Impression   Heart is enlarged with increased interstitial markings seen throughout the   lung fields. Mild pulmonary vascular congestion cannot be excluded. Echocardiogram:   1/10/23   Summary   Bradycardia noted. LV systolic function is mildly reduced. Ejection fraction is visually estimated at 50-55%.    Grade II diastolic dysfunction. No regional wall motion abnormalities seen. Moderate left ventricular concentric hypertrophy noted. Abnormal LV septal motion consistent with post-operative state. Normal right ventricular size and function. Biatrial dilation. Severe aortic stenosis is present. Stress test:    Pharmacologic stress 1/9/2023  Impression   1. ECG during the infusion did not change. 2.  The myocardial perfusion imaging was abnormal.   3.  The abnormality was a small sized, mild perfusion defect involving   the basal anterior wall and basal anterolateral walls suggestive of   ischemia. 4.  Overall left ventricular systolic function was normal with   regional wall motion abnormalities. 5.  No transient ischemic dilatation. 6.  Intermediate risk general pharmacologic stress test.     Cardiac catheterization:   Not available  ----------------------------------------------------------------------------------------------------------------------------------------------------------------  IMPRESSION:  Elevated hs-cTnT: 100-85 ng/L. Possible NSTEMI. Chest pain-free  Abnormal stress test.  Personally reviewed, overall low risk  Atypical, chronic chest pain. Patient currently denies  Severe aortic stenosis. Some notes mention AVR at time of CABG, not clear, appears to be native valve on echo  [facility] details unknown  Acute heart failure with preserved ejection fraction, improved. proBNP 3800. COPD/chronic hypoxic respiratory failure.    Ongoing tobacco abuse  Severe hypothyroidism, TSH 48, undetectable free T4  Encephalopathy  Hypertension  Noncompliance    RECOMMENDATIONS:    As he is currently remains chest pain-free with overall low risk stress findings, and demonstrates poor insight and judgment as well as general confusion and intermittent impulsive behavior, recommend continued medical management as opposed to invasive coronary angiography at this time  Continue home medications of aspirin and clopidogrel, unclear indication for long-term DAPT, will continue for now  Optimize antianginal therapy add ranolazine 500 mg twice daily  Decrease metoprolol tartrate to 25 mg twice daily given bradycardia and relative hypotension  Decrease furosemide to 20 mg daily  Continue high intensity statin  Try to obtain records from [place] regarding heart surgery -requested and pending  Recommend outpatient evaluation for TAVR at the [place] or other facility as we are not currently doing TAVR for at least another month or 2  Patient unable to care for self at home, plans underway for facility placement  Further care per primary service  Okay for discharge to rehab from cardiology standpoint with outpatient cardiology follow-up    Karla Verdin MD, [facility] Cardiology    NOTE: This report was transcribed using voice recognition software. Every effort was made to ensure accuracy; however, inadvertent computerized transcription errors may be present.

## 2023-01-11 NOTE — DISCHARGE SUMMARY
Physician Discharge Summary     Patient ID:  Minoo Pinto  [id number]  89 y.o.  1950    Admit date: 1/8/2023    Discharge date and time:  1/12/2023    Discharge Diagnoses: Principal Problem:    Chest pain  Active Problems:    Abnormal cardiovascular stress test    Elevated troponin    NSTEMI (non-ST elevated myocardial infarction) (HCC)    Severe aortic stenosis    Abnormal stress test    COPD exacerbation (HCC)    Chronic respiratory failure with hypoxia (HCC)  Resolved Problems:    * No resolved hospital problems. *      Consults: IP CONSULT TO INTERNAL MEDICINE  IP CONSULT TO CARDIOLOGY    Procedures: See below    Hospital Course:Patient with a hx of COPD, depression, HLD, HTN, admitted on 1/8/2023 after presenting for chest pain and shortness of breath. Chronic hypoxic respiratory failure continue home oxygen 4L    Chest pain/elevated troponin/abnormal stress test  Stress test-- Abnormal myocardial perfusion imaging with small size mild perfusion defect involving basal anterior wall and basal anterolateral wall suggestive of ischemia-intermediate risk  Cardiology consult    COPD exacerbation  Continue steroids transition to oral  Nebulizers    Acute on chronic diastolic CHF treated IV diuresis, resume oral Lasix strict I/O,monitor renal function  Will need oxygen set up for him prior to discharge    Hypothyroid, continue levothyroxine 125 mcg daily-elevated TSH- free T4 less than 0.10  -Suspect noncompliance with levothyroxine--repeat TSH and free T4    Diet: ADULT DIET;  Regular; Low Fat/Low Chol/High Fiber/YESSENIA  Code Status: Full Code  PT/OT Eval Status:   as needed  DVT Prophylaxis:   lovenox  Recommended disposition at discharge: Patient unable to return home due to debility, confusion and high risk for readmission, will discharge to SNF today    Discharge Exam:  See progress note from today    Condition:  Stable    Disposition: SNF    Patient Instructions:   Current Discharge Medication List START taking these medications    Details   aspirin 81 MG chewable tablet Take 1 tablet by mouth daily  Qty: 30 tablet, Refills: 3      ranolazine (RANEXA) 500 MG extended release tablet Take 1 tablet by mouth 2 times daily  Qty: 60 tablet, Refills: 3      predniSONE (DELTASONE) 20 MG tablet Take 1 tablet by mouth 2 times daily for 3 days  Qty: 6 tablet, Refills: 0           CONTINUE these medications which have CHANGED    Details   metoprolol tartrate (LOPRESSOR) 25 MG tablet Take 1 tablet by mouth 2 times daily  Qty: 60 tablet, Refills: 3      furosemide (LASIX) 20 MG tablet Take 1 tablet by mouth daily  Qty: 60 tablet, Refills: 3      levothyroxine (SYNTHROID) 125 MCG tablet Take 1 tablet by mouth Daily  Qty: 30 tablet, Refills: 3           CONTINUE these medications which have NOT CHANGED    Details   ! !  Multiple Vitamins-Minerals (THERAPEUTIC MULTIVITAMIN-MINERALS) tablet Take 1 tablet by mouth daily      omeprazole (PRILOSEC) 40 MG delayed release capsule Take 40 mg by mouth daily      guaiFENesin 400 MG tablet Take 400 mg by mouth 3 times daily as needed for Cough      mineral oil-hydrophilic petrolatum (HYDROPHOR) ointment Apply topically as needed for Dry Skin      albuterol sulfate HFA (PROVENTIL;VENTOLIN;PROAIR) 108 (90 Base) MCG/ACT inhaler Inhale 2 puffs into the lungs every 6 hours as needed for Wheezing      ipratropium-albuterol (DUONEB) 0.5-2.5 (3) MG/3ML SOLN nebulizer solution Take 1 vial by nebulization every 6 hours as needed for Shortness of Breath      Budeson-Glycopyrrol-Formoterol 160-9-4.8 MCG/ACT AERO Inhale 2 puffs into the lungs 2 times daily      Calcium-Magnesium-Zinc 333..33-5 MG TABS Take 2 tablets by mouth daily      rosuvastatin (CRESTOR) 40 MG tablet Take 40 mg by mouth at bedtime      albuterol (ACCUNEB) 0.63 MG/3ML nebulizer solution Take 1 ampule by nebulization every 6 hours as needed for Wheezing      traMADol (ULTRAM) 50 MG tablet Take 50 mg by mouth every 6 hours as needed for Pain. clopidogrel (PLAVIX) 75 MG tablet Take 75 mg by mouth daily      vitamin E 400 UNIT capsule Take 400 Units by mouth daily      !! LUTEIN-ZEAXANTHIN PO Take 1 tablet by mouth daily      OXYGEN Inhale into the lungs       !! - Potential duplicate medications found. Please discuss with provider.         STOP taking these medications       budesonide-formoterol (SYMBICORT) 160-4.5 MCG/ACT AERO Comments:   Reason for Stopping:         diclofenac sodium 1 % GEL Comments:   Reason for Stopping:         BUDESONIDE PO Comments:   Reason for Stopping:         miconazole (MICOTIN) 2 % cream Comments:   Reason for Stopping:         tiotropium (SPIRIVA) 18 MCG inhalation capsule Comments:   Reason for Stopping:         pregabalin (LYRICA) 50 MG capsule Comments:   Reason for Stopping:         pantoprazole (PROTONIX) 40 MG tablet Comments:   Reason for Stopping:         Omega-3 Fatty Acids (FISH OIL) 500 MG CAPS Comments:   Reason for Stopping:         raNITIdine HCl (RANITIDINE 150 MAX STRENGTH PO) Comments:   Reason for Stopping:         amitriptyline (ELAVIL) 10 MG tablet Comments:   Reason for Stopping:         calcium carbonate (OSCAL) 500 MG TABS tablet Comments:   Reason for Stopping:             Activity: activity as tolerated  Diet: cardiac diet    Follow-up with 1wk PCP    Note that over 30 minutes was spent in preparing discharge papers, discussing discharge with patient, staff, consultants, medication review, arranging follow up, etc.    Signed:  Rosemary Martin MD  1/11/2023  12:16 PM

## 2023-01-12 VITALS
OXYGEN SATURATION: 100 % | HEART RATE: 122 BPM | WEIGHT: 206 LBS | BODY MASS INDEX: 27.9 KG/M2 | RESPIRATION RATE: 16 BRPM | DIASTOLIC BLOOD PRESSURE: 59 MMHG | SYSTOLIC BLOOD PRESSURE: 92 MMHG | TEMPERATURE: 97 F

## 2023-01-12 PROCEDURE — 97535 SELF CARE MNGMENT TRAINING: CPT

## 2023-01-12 PROCEDURE — 2580000003 HC RX 258: Performed by: INTERNAL MEDICINE

## 2023-01-12 PROCEDURE — 6370000000 HC RX 637 (ALT 250 FOR IP): Performed by: INTERNAL MEDICINE

## 2023-01-12 PROCEDURE — 6360000002 HC RX W HCPCS: Performed by: INTERNAL MEDICINE

## 2023-01-12 PROCEDURE — 2700000000 HC OXYGEN THERAPY PER DAY

## 2023-01-12 PROCEDURE — 97530 THERAPEUTIC ACTIVITIES: CPT

## 2023-01-12 PROCEDURE — 99232 SBSQ HOSP IP/OBS MODERATE 35: CPT | Performed by: INTERNAL MEDICINE

## 2023-01-12 PROCEDURE — 94640 AIRWAY INHALATION TREATMENT: CPT

## 2023-01-12 RX ADMIN — PREDNISONE 20 MG: 20 TABLET ORAL at 08:38

## 2023-01-12 RX ADMIN — Medication 10 ML: at 08:36

## 2023-01-12 RX ADMIN — ARFORMOTEROL TARTRATE 15 MCG: 15 SOLUTION RESPIRATORY (INHALATION) at 08:02

## 2023-01-12 RX ADMIN — CLOPIDOGREL BISULFATE 75 MG: 75 TABLET ORAL at 08:41

## 2023-01-12 RX ADMIN — Medication 400 UNITS: at 08:39

## 2023-01-12 RX ADMIN — PREGABALIN 50 MG: 25 CAPSULE ORAL at 13:37

## 2023-01-12 RX ADMIN — IPRATROPIUM BROMIDE AND ALBUTEROL SULFATE 1 AMPULE: .5; 2.5 SOLUTION RESPIRATORY (INHALATION) at 08:02

## 2023-01-12 RX ADMIN — MICONAZOLE NITRATE: 20 CREAM TOPICAL at 08:37

## 2023-01-12 RX ADMIN — LEVOTHYROXINE SODIUM 125 MCG: 25 TABLET ORAL at 06:29

## 2023-01-12 RX ADMIN — ENOXAPARIN SODIUM 40 MG: 100 INJECTION SUBCUTANEOUS at 08:37

## 2023-01-12 RX ADMIN — PANTOPRAZOLE SODIUM 40 MG: 40 TABLET, DELAYED RELEASE ORAL at 08:40

## 2023-01-12 RX ADMIN — RANOLAZINE 500 MG: 500 TABLET, EXTENDED RELEASE ORAL at 08:39

## 2023-01-12 RX ADMIN — FUROSEMIDE 20 MG: 40 TABLET ORAL at 08:39

## 2023-01-12 RX ADMIN — PREGABALIN 50 MG: 25 CAPSULE ORAL at 08:39

## 2023-01-12 RX ADMIN — IPRATROPIUM BROMIDE AND ALBUTEROL SULFATE 1 AMPULE: .5; 2.5 SOLUTION RESPIRATORY (INHALATION) at 11:59

## 2023-01-12 RX ADMIN — IPRATROPIUM BROMIDE AND ALBUTEROL SULFATE 1 AMPULE: .5; 2.5 SOLUTION RESPIRATORY (INHALATION) at 16:00

## 2023-01-12 RX ADMIN — CALCIUM 500 MG: 500 TABLET ORAL at 08:40

## 2023-01-12 RX ADMIN — DICLOFENAC SODIUM 2 G: 10 GEL TOPICAL at 08:36

## 2023-01-12 RX ADMIN — BUDESONIDE 500 MCG: 0.5 SUSPENSION RESPIRATORY (INHALATION) at 08:02

## 2023-01-12 RX ADMIN — ASPIRIN 81 MG CHEWABLE TABLET 81 MG: 81 TABLET CHEWABLE at 08:38

## 2023-01-12 NOTE — CARE COORDINATION
Update: pt's kamryn Flores does not have HC/POA; this CM notified Gricelda Cruz with [facility] and received return call from [place] with [facility] whom relayed facility can still accept with kamryn Flores whom can sign pt in as surrogate; this CM notified Albert Flores via [place] [phone] of the aforementioned, phone number for facility, and to return my call with any questions or concerns; notified attending via [employer], pt is discharged; transportation set-up for 2 pm tentatively d/t verification of potential partial  payment of $640.00; this CM notified CM manager LB whom is also checking with [facility]. [email or web address]: Transport setup for 1400; attending notified via [employer], liaison Gricelda Cruz (no rapid covid required), bedside RN Anu Higgins and son Albert Flores via phone [phone]. Envelope, ambulance form, and 7000 completed in soft-chart.

## 2023-01-12 NOTE — PLAN OF CARE
Problem: Discharge Planning  Goal: Discharge to home or other facility with appropriate resources  Outcome: Progressing     Problem: Risk for Elopement  Goal: Patient will not exit the unit/facility without proper excort  Outcome: Progressing     Problem: Safety - Adult  Goal: Free from fall injury  Outcome: Progressing     Problem: Confusion  Goal: Confusion, delirium, dementia, or psychosis is improved or at baseline  Description: INTERVENTIONS:  1. Assess for possible contributors to thought disturbance, including medications, impaired vision or hearing, underlying metabolic abnormalities, dehydration, psychiatric diagnoses, and notify attending LIP  2. [place] high risk fall precautions, as indicated  3. Provide frequent short contacts to provide reality reorientation, refocusing and direction  4. Decrease environmental stimuli, including noise as appropriate  5. Monitor and intervene to maintain adequate nutrition, hydration, elimination, sleep and activity  6. If unable to ensure safety without constant attention obtain sitter and review sitter guidelines with assigned personnel  7. Initiate Psychosocial CNS and Spiritual Care consult, as indicated  Outcome: Progressing     Problem: Chronic Conditions and Co-morbidities  Goal: Patient's chronic conditions and co-morbidity symptoms are monitored and maintained or improved  Outcome: Progressing     Problem: Skin/Tissue Integrity  Goal: Absence of new skin breakdown  Description: 1. Monitor for areas of redness and/or skin breakdown  2. Assess vascular access sites hourly  3. Every 4-6 hours minimum:  Change oxygen saturation probe site  4. Every 4-6 hours:  If on nasal continuous positive airway pressure, respiratory therapy assess nares and determine need for appliance change or resting period.   Outcome: Progressing

## 2023-01-12 NOTE — PROGRESS NOTES
INPATIENT CARDIOLOGY FOLLOW-UP    Name: Lars Lesch    Age: 71 y.o. Date of Admission: 1/8/2023 11:29 AM    Date of Service: 1/12/2023    [facility]    Chief Complaint: Follow-up for abnormal stress test, CAD, AAS    Interim History:  No new issues. Appears calm. Denies chest pain shortness of breath, wants to be discharged home, awaiting discharge to rehab. Review of Systems:   Negative except as described above    Problem List:  Patient Active Problem List   Diagnosis    CAD (coronary artery disease)    Hx of CABG    Atrial fibrillation (HCC)    HLD (hyperlipidemia)    Tobacco abuse    Hypoxia    Stage 4 very severe COPD by GOLD classification ([facility]    COPD exacerbation ([facility]    Chronic respiratory failure with hypoxia (HCC)    Obesity (BMI 30.0-34. 9)    Leukocytosis    Lactic acidosis    Congestive heart failure (HCC)    HCAP (healthcare-associated pneumonia)    Flu    Chest pain    Abnormal cardiovascular stress test    Elevated troponin    NSTEMI (non-ST elevated myocardial infarction) (HCC)    Severe aortic stenosis    Abnormal stress test       Current Medications:    Current Facility-Administered Medications:     furosemide (LASIX) tablet 20 mg, 20 mg, Oral, Daily, Mary Victor MD, 20 mg at 01/12/23 0839    predniSONE (DELTASONE) tablet 20 mg, 20 mg, Oral, BID, Paxton Giles MD, 20 mg at 01/12/23 2500    perflutren lipid microspheres (DEFINITY) injection 1.5 mL, 1.5 mL, IntraVENous, ONCE PRN, Mary Victor MD    ranolazine [facility]) extended release tablet 500 mg, 500 mg, Oral, BID, Mary Victor MD, 500 mg at 01/12/23 0839    metoprolol tartrate (LOPRESSOR) tablet 25 mg, 25 mg, Oral, BID, Mary Victor MD, 25 mg at 01/11/23 2021    sodium chloride flush 0.9 % injection 5-40 mL, 5-40 mL, IntraVENous, 2 times per day, Abel Valenzuela DO, 10 mL at 01/12/23 0836    sodium chloride flush 0.9 % injection 5-40 mL, 5-40 mL, IntraVENous, PRN, Humberto Rodríguez, DO    0.9 % sodium chloride infusion, , IntraVENous, PRN, Aria Valenzuela DO    enoxaparin (LOVENOX) injection 40 mg, 40 mg, SubCUTAneous, Daily, Aria Valenzuela DO, 40 mg at 01/12/23 0837    ondansetron (ZOFRAN-ODT) disintegrating tablet 4 mg, 4 mg, Oral, Q8H PRN **OR** ondansetron (ZOFRAN) injection 4 mg, 4 mg, IntraVENous, Q6H PRN, Abel Valenzuela DO    polyethylene glycol (GLYCOLAX) packet 17 g, 17 g, Oral, Daily PRN, Aria Valenzuela DO    acetaminophen (TYLENOL) tablet 650 mg, 650 mg, Oral, Q6H PRN **OR** acetaminophen (TYLENOL) suppository 650 mg, 650 mg, Rectal, Q6H PRN, Lennox Valenzuela DO    hydrALAZINE (APRESOLINE) injection 10 mg, 10 mg, IntraVENous, Q6H PRN, Abel Valenzuela DO    melatonin tablet 3 mg, 3 mg, Oral, Nightly PRN, Aria Valenzuela DO    calcium carbonate (TUMS) chewable tablet 500 mg, 500 mg, Oral, TID PRN, Lennox Valenzuela DO    ipratropium-albuterol (DUONEB) nebulizer solution 1 ampule, 1 ampule, Inhalation, Q4H While awake, Abel Valenzuela DO, 1 ampule at 01/12/23 0802    calcium elemental (OSCAL) tablet 500 mg, 500 mg, Oral, Daily, Aria Valenzuela DO, 500 mg at 01/12/23 0840    clopidogrel (PLAVIX) tablet 75 mg, 75 mg, Oral, Daily, Aria Valenzuela DO, 75 mg at 01/12/23 0841    diclofenac sodium (VOLTAREN) 1 % gel 2 g, 2 g, Topical, BID, Aria Valenzuela DO, 2 g at 01/12/23 8627    levothyroxine (SYNTHROID) tablet 125 mcg, 125 mcg, Oral, Daily, Aria Valenzuela DO, 125 mcg at 01/12/23 9511    pantoprazole (PROTONIX) tablet 40 mg, 40 mg, Oral, Daily, Aria Valenzuela DO, 40 mg at 01/12/23 0840    pregabalin (LYRICA) capsule 50 mg, 50 mg, Oral, TID, Aria Valenzuela DO, 50 mg at 01/12/23 0839    traMADol (ULTRAM) tablet 50 mg, 50 mg, Oral, Q6H PRN, Lennox Valenzuela DO    vitamin E capsule 400 Units, 400 Units, Oral, Daily, Aria Valenzuela DO, 400 Units at 01/12/23 0839    budesonide (PULMICORT) nebulizer suspension 500 mcg, 0.5 mg, Nebulization, BID, Brittani Valenzuela DO, 500 mcg at 01/12/23 0802    arformoterol tartrate (BROVANA) nebulizer solution 15 mcg, 15 mcg, Nebulization, BID, Aria Valenzuela DO, 15 mcg at 01/12/23 0802    aspirin chewable tablet 81 mg, 81 mg, Oral, Daily, Aria Valenzuela DO, 81 mg at 01/12/23 6404    rosuvastatin (CRESTOR) tablet 40 mg, 40 mg, Oral, Nightly, Aria Valenzuela DO, 40 mg at 01/11/23 2021    miconazole (MICOTIN) 2 % cream, , Topical, BID, Michell Dominguez, , Given at 01/12/23 2071    Physical Exam:  BP (!) 91/59   Pulse (!) 122   Temp 97.2 °F (36.2 °C) (Oral)   Resp 18   Wt 206 lb (93.4 kg)   SpO2 96%   BMI 27.90 kg/m²   Wt Readings from Last 3 Encounters:   01/12/23 206 lb (93.4 kg)   12/20/22 205 lb 0.4 oz (93 kg)   09/23/22 230 lb (104.3 kg)     Appearance: Awake, alert, no acute respiratory distress  Skin: Intact, no rash  Head: Normocephalic, atraumatic  Eyes: EOMI, no conjunctival erythema  ENMT: No pharyngeal erythema, MMM, no rhinorrhea  Neck: Supple, no elevated JVP, no carotid bruits  Lungs: Clear to auscultation bilaterally. No wheezes, rales, or rhonchi. Cardiac: PMI nondisplaced, Regular rhythm with a normal rate, S1 & S2 normal, 3/6 late peaking systolic murmur right upper sternal border S2 somewhat muffled  Abdomen: Soft, nontender, +bowel sounds  Extremities: Moves all extremities x 4, no lower extremity edema  Neurologic: No focal motor deficits apparent, normal mood and affect  Peripheral Pulses: Intact posterior tibial pulses bilaterally    Intake/Output:    Intake/Output Summary (Last 24 hours) at 1/12/2023 0934  Last data filed at 1/11/2023 2137  Gross per 24 hour   Intake 220 ml   Output 500 ml   Net -280 ml     No intake/output data recorded.     Laboratory Tests:  Recent Labs     01/10/23  0630 01/11/23  0653    136   K 4.3 4.4    102   CO2 23 27   BUN 14 18   CREATININE 1.1 1.1   GLUCOSE 120* 119*   CALCIUM 8.7 8.4*     Lab Results   Component Value Date/Time    MG 1.9 2023 04:07 AM     Recent Labs     01/10/23  0630 23  0653   ALKPHOS 113 112   ALT 7 7   AST 13 12   PROT 6.1* 5.9*   BILITOT 0.5 0.3   LABALBU 3.3* 3.2*     Recent Labs     01/10/23  0630 23  0653   WBC 7.9 9.7   RBC 3.73* 3.56*   HGB 11.7* 11.2*   HCT 36.6* 34.9*   MCV 98.1 98.0   MCH 31.4 31.5   MCHC 32.0 32.1   RDW 13.8 14.0    251   MPV 10.8 10.8     Lab Results   Component Value Date    CKTOTAL 148 10/27/2017    CKMB 6.5 10/27/2017    TROPONINI 0.02 2018    TROPONINI <0.01 2018    TROPONINI <0.01 2018     Lab Results   Component Value Date    INR 1.2 2018    INR 1.2 2018    PROTIME 13.2 (H) 2018    PROTIME 12.8 (H) 2018     Lab Results   Component Value Date    TSH 48.450 (H) 2023     Lab Results   Component Value Date    LABA1C 5.5 2023     No results found for: EAG  Lab Results   Component Value Date    CHOL 187 2023     Lab Results   Component Value Date    TRIG 82 2023     Lab Results   Component Value Date    HDL 40 2023     Lab Results   Component Value Date    LDLCALC 131 (H) 2023     Lab Results   Component Value Date    LABVLDL 16 2023     No results found for: CHOLHDLRATIO  No results for input(s): PROBNP in the last 72 hours. Cardiac Tests:    EK2023: Sinus rhythm 61 bpm, blocked PACs. Left axis. Nonspecific ST-T wave changes    1/10/2023: Sinus rhythm with frequent PACs and PVCs. Left axis. Nonspecific T wave changes    Telemetry: Sinus rhythm 60s with PACs and PVCs, off monitor currently    Chest X-ray:   23    Impression   Heart is enlarged with increased interstitial markings seen throughout the   lung fields. Mild pulmonary vascular congestion cannot be excluded. Echocardiogram:   1/10/23   Summary   Bradycardia noted. LV systolic function is mildly reduced. Ejection fraction is visually estimated at 50-55%. Grade II diastolic dysfunction.    No regional wall motion abnormalities seen. Moderate left ventricular concentric hypertrophy noted. Abnormal LV septal motion consistent with post-operative state. Normal right ventricular size and function. Biatrial dilation. Severe aortic stenosis is present. Stress test:    Pharmacologic stress 1/9/2023  Impression   1. ECG during the infusion did not change. 2.  The myocardial perfusion imaging was abnormal.   3.  The abnormality was a small sized, mild perfusion defect involving   the basal anterior wall and basal anterolateral walls suggestive of   ischemia. 4.  Overall left ventricular systolic function was normal with   regional wall motion abnormalities. 5.  No transient ischemic dilatation. 6.  Intermediate risk general pharmacologic stress test.     Cardiac catheterization:   Not available  ----------------------------------------------------------------------------------------------------------------------------------------------------------------  IMPRESSION:  Elevated hs-cTnT: 100-85 ng/L. Possible NSTEMI. Chest pain-free  Abnormal stress test.  Personally reviewed, overall low risk  Atypical, chronic chest pain. Patient currently denies  Severe aortic stenosis. Some notes mention AVR at time of CABG, not clear, appears to be native valve on echo  [facility] details unknown  Acute heart failure with preserved ejection fraction, improved. proBNP 3800. COPD/chronic hypoxic respiratory failure.    Ongoing tobacco abuse  Severe hypothyroidism, TSH 48, undetectable free T4  Encephalopathy  Hypertension  Noncompliance    RECOMMENDATIONS:    As he is currently remains chest pain-free with overall low risk stress findings, and demonstrates poor insight and judgment as well as general confusion and intermittent impulsive behavior, recommend continued medical management as opposed to invasive coronary angiography at this time  If develops anginal chest pain, would consider proceeding with heart catheterization  Continue home medications of aspirin and clopidogrel, unclear indication for long-term DAPT, will continue for now  Optimize antianginal therapy add ranolazine 500 mg twice daily  Continue metoprolol tartrate, decreased to 25 mg twice daily given bradycardia and relative hypotension  Continue furosemide 20 mg daily  Continue high intensity statin  Try to obtain records from [place] regarding heart surgery -requested and pending  Recommend outpatient evaluation for TAVR at the [place] or other facility as we are not currently doing TAVR for at least another month or 2  Patient unable to care for self at home, plans underway for facility placement  Further care per primary service  Okay for discharge to rehab from cardiology standpoint with outpatient cardiology follow-up    Yael Loo MD, [facility]    NOTE: This report was transcribed using voice recognition software. Every effort was made to ensure accuracy; however, inadvertent computerized transcription errors may be present.

## 2023-01-12 NOTE — PROGRESS NOTES
OCCUPATIONAL THERAPY TREATMENT NOTE   [address] [address] [address]      PFLY:  Patient Name: Dean Redd  MRN: [id number]  : 1950  Room: [address] [place] [address]     Evaluating OT: CARITO Arevalo, OTR/L  # [id number]     Referring Provider:  Jojo Dixon MD  Specific Provider Orders:  [place] [facility]  1-10-23     Diagnosis: Chest pain [R07.9]  Chronic obstructive pulmonary disease with acute exacerbation ([facility] [J44.1]  Chest pain, unspecified type [R07.9]     Pt was admitted w/ Chest pain, Cough, SOB, Hypoxia - Ran out of Oxygen at home - confused, agitated     Pertinent Medical History:  Pt has a past medical history of Alcohol dependence ([facility] Arthritis, Chronic low back pain, COPD (chronic obstructive pulmonary disease) ([facility] Coronary arteriosclerosis, Depression, Hyperlipidemia, Hypertension, Osteomyelitis ([facility] [facility] Pneumonia, Rhinitis, Spinal stenosis, lumbar, and Thyroid disease.,  has a past surgical history that includes Coronary artery bypass graft ().      Surgeries this admission: 23: Cardiac Stress Test      Precautions:  Fall Risk, TSM, Cognition - Bed alarm, Impulsive, 4L O2 via NC     Assessment of current deficits   [x] Functional mobility             [x]ADLs           [x] Strength                  [x]Cognition   [x] Functional transfers           [x] IADLs         [x] Safety Awareness   [x]Endurance   [] Fine Coordination              [x] Balance     [] Vision/perception    []Sensation     []Gross Motor Coordination  [] ROM           [] Delirium                  [] Motor Control         OT PLAN OF CARE   OT POC based on physician orders, patient diagnosis and results of clinical assessment     Frequency/Duration 1-3 days/wk for 2 weeks PRN   Specific OT Treatment to include:      * Instruction/training on adapted ADL techniques and AE recommendations to increase functional independence within precautions * Training on energy conservation strategies, correct breathing pattern and techniques to improve independence/tolerance for self-care routine  * Functional transfer/mobility training/DME recommendations for increased independence, safety, and fall prevention  * Patient/Family education to increase follow through with safety techniques and functional independence  * Recommendation of environmental modifications for increased safety with functional transfers/mobility and ADLs  * Cognitive retraining/development of therapeutic activities to improve problem solving, judgement, memory, and attention for increased safety/participation in ADL/IADL tasks  * Therapeutic exercise to improve motor endurance, ROM, and functional strength for ADLs/functional transfers  * Therapeutic activities to facilitate/challenge dynamic balance, stand tolerance for increased safety and independence with ADLs  * Therapeutic activities to facilitate gross/fine motor skills for increased independence with ADLs  * Neuro-muscular re-education: facilitation of righting/equilibrium reactions  * Positioning to improve skin integrity, interaction with environment and functional independence  * Delirium prevention/treatment  * Manual techniques for edema management  Other:     Recommended Adaptive Equipment: w/w, 3-in-1 commode     Per OT Eval:  Home Living:  Pt lives alone in a single-level apartment. Bathroom setup:  Walk-in-Shower, Standard-height Commode   Equipment owned:  [place], Shower Chair, 4L Home O2     Available Family Assist:  None reported     Prior Level of Function:  Pt reported being IND with all ADLs, IADLs, Transfers and Mobility using QC vs No AD for ambulation. Difficulty completing IADLs d/t limited endurance/balance  Driving:  Not reported  Occupation:  Not reported;  [employer] [place] [id number] [employer]     Pain Level: No c/o pain this date. Cognition: Alert & grossly oriented x3. Follows 1 step commands intermittently. Impulsive, decreased insight into deficits. Memory:  fair               Sequencing:  fair-               Problem solving:  fair-               Judgement/safety:  Poor+     Vitals/Lab Values:  86-94% on 4L O2 via NC. Pt able to recover quickly with cues for proper breathing technique as pt is noted to be a mouth breather. Functional Assessment:  AM-PAC Daily Activity Raw Score: 17/24    Initial Eval Status  Date: 1-11-23    Treatment Status  Date:  1/12/23 STGs = LTGs  Time frame: 10-14 days   Feeding IND after set up         NA   Grooming Min A/Set up     Min A for hair care, SUP for all other tasks w/ Min VCs for problem solving/quality of task  Seated at the sink  Unable to tolerate standing at sink for task d/t limited endurance     SBA/S  Seated in chair pt washed face/hands and combed hair. Min cues for safety and sequencing required. SUP/Set up  Standing at the [address] [place] [address] Ln A/Set up     Donning/doffing gown seated in chair, Min VCs to problem solve task     SBA/S  To don/doff hospital gown while seated in chair with min cues for sequencing/problem solving required to complete. SUP/Set up      LB Dressing Mod A/Set up     Min A to thread LEs into/out of pants, don/doff socks - reaching to floor for ax despite VCs for use of Cross-legged tech  Min A to pull pants over hips w/ Mod A for safety w/ standing balance  Mod VCs/Pt ed for safe/adaptive techs, use of adaptive equip    Mod A  To don/doff pants and socks. Assist required to thread L LE into pants, to don L sock and for balance/safety while standing to manage clothing over hips. Cues for safety required d/t impulsivity during task.   SUP/Set up      Bathing Mod A/Set up     Min A for UB sponge bathing - Seated  Assist to wash back/buttocks/posterior thighs w/ Mod A for safety w/ standing balance, SUP for LB sponge-bathing otherwise - seated     Pt ed re: safe/adaptive techs, use of Shower chair     Mod A  Sponge bath completed seated/standing at sink. Assist required for balance/safety while standing to wash d/t impaired balance and decreased safety and to wash feet. Min cues for safety required with fair/- follow through. SUP/Set up      Toileting Mod A     Able to complete bowel hygiene seated  Min A for clothing adjustment over hips + Mod A for safety w/ standing balance for task  Mod VCs for safety    Min A  Pt able to complete posterior personal hygiene and clothing management with Min A for safety. SUP      Bed Mobility  Supine to sit: SUP   Sit to supine:  SUP      VCs for safety        Supine>Sit: Min A  Sit>Supine: Supervision  Pt required assist with trunk during supine>sit transfer. Min cues for safety required during sit>supine transfer d/t impulsivity secondary to fatigue. Supine to sit: IND  Sit to supine: IND      Functional Transfers Min A sit to stand  Mod A stand to sit - very impulsive, \"plops\" despite Mod VCs     EOB 2x, Commode 1x, Chair 3x  Pt ed for safety/hand placement     Sit<>Stand: Mod/Min A  Commode Transfer: Mod A  Assist required for safety during functional transfers d/t decreased eccentric control and safety. Mod verbal/visual cues for hand placement/safety required with limited follow through. SUP      Functional Mobility Min A w/ [employer]  Mod VCs for walker safety     Household distances in room Bed<>Bathroom  Pt ed for safety/improved safety awareness, walker safety    Min A  Completed household distances within pt's room/bathroom with w/w. Poor walker safety. SUP      Balance Sitting:     Static:  SUP    Dynamic:  Close SUP w/ functional ax EOB/Commode/Unarmed Chair      Standing:     Static:  Min A w/ [employer]    Dynamic:  Mod A w/ functional ax/mobility w/ [employer]    Sitting:     Static: Supervision    Dynamic: SBA     Standing:     Static: CGA    Dynamic: Min A    Demo'd during functional dynamic ADL tasks and transfers/mobility.   Sitting:     Static:  IND    Dynamic:  IND w/ functional ax Standing:     Static:  SUP w/ AD PRN    Dynamic:  SUP w/ functional ax/mobility w/ AD PRN   Activity Tolerance Fair     Limited endurance, Hypoxic     Fair  Frequent rest breaks required with light activity. Cues for proper breathing technique and pacing provided as needed. Good(-)   Visual/  Perceptual     Hearing: WFL   Glasses: None at b/s     [facility]   Hearing Aids:  None at b/s                      Comments: Upon arrival pt in supine with HOB elevated. Pt educated on adaptive techniques for ADL tasks, functional transfer/mobility safety, walker safety, proper breathing techniques, EC, pacing and safety/fall prevention. Pt verbalized/demonstrated fair- understanding, recommend continued education. Skilled monitoring of vital signs completed throughout session. O2 86-94% on 4L O2 via NC, pt able to recover to >90% with cues for proper breathing technique. At end of session pt returned to supine with HOB elevated, all lines and tubes intact, call light within reach and bed alarm activated. Pt has made fair progress towards set goals.    Continue with current plan of care    Treatment Time In:1040            Treatment Time Out: 9893             Treatment Charges: Mins Units   Ther Ex  [id number]     Manual Therapy [id number]     Thera Activities 26910 14 1   ADL/Home Mgt [id number] 25 2   Neuro Re-ed [id number]     Group Therapy      Orthotic manage/training  38859     Non-Billable Time     Total Timed Treatment [address] CHATMAN/L [address]

## 2023-01-12 NOTE — DISCHARGE INSTR - OTHER ORDERS
Patient is Alert but oriented to self only. Speech s garbled. Able to use urinal and feed self independently.

## 2023-01-12 NOTE — PROGRESS NOTES
RN-RN report is called to [employer]; spoke to Raegan Perdomo. All questions are answered. TERESA filled out and printed for reference.  time of 14:00 is also spoken. Unit number is provided if any questions or concerns arise.     It was a pleasure taking care of Mr. Zeke Lee

## 2023-01-12 NOTE — PROGRESS NOTES
Hospitalist Progress Note      Synopsis: Patient with a hx of COPD, depression, HLD, HTN, admitted on 1/8/2023 after presenting for chest pain and shortness of breath. Subjective  Feeling better no complaints Want to go home, but agreeable to SNF  No CP or SOB  No fever or chills   No uncontrolled pain  No vomiting or diarrhea   No events reported overnight      Exam:  BP (!) 91/59   Pulse (!) 122   Temp 97.2 °F (36.2 °C) (Oral)   Resp 18   Wt 206 lb (93.4 kg)   SpO2 96%   BMI 27.90 kg/m²   General appearance: No apparent distress, appears stated age and cooperative. Slow in reponse but oriented x4  HEENT: Conjunctivae/corneas clear. Mucous membranes moist.  Neck: Supple. No JVD. Respiratory: Diminished to auscultation bilaterally. Normal respiratory effort. Cardiovascular:  RRR. S1, S2 without MRG. PV: Pulses palpable. No edema. Abdomen: Soft, non-tender, non-distended. +BS  Musculoskeletal: No obvious deformities. Skin: Normal skin color. No rashes or lesions. Good turgor. Neurologic:  Grossly non-focal. Awake, alert, following commands.    Psychiatric: Alert and oriented, thought content appropriate, normal insight and judgement     Medications:  Reviewed    Infusion Medications    sodium chloride       Scheduled Medications    furosemide  20 mg Oral Daily    predniSONE  20 mg Oral BID    ranolazine  500 mg Oral BID    metoprolol tartrate  25 mg Oral BID    sodium chloride flush  5-40 mL IntraVENous 2 times per day    enoxaparin  40 mg SubCUTAneous Daily    ipratropium-albuterol  1 ampule Inhalation Q4H While awake    calcium elemental  500 mg Oral Daily    clopidogrel  75 mg Oral Daily    diclofenac sodium  2 g Topical BID    levothyroxine  125 mcg Oral Daily    pantoprazole  40 mg Oral Daily    pregabalin  50 mg Oral TID    vitamin E  400 Units Oral Daily    budesonide  0.5 mg Nebulization BID    arformoterol tartrate  15 mcg Nebulization BID    aspirin  81 mg Oral Daily    rosuvastatin 40 mg Oral Nightly    miconazole   Topical BID     PRN Meds: perflutren lipid microspheres, sodium chloride flush, sodium chloride, ondansetron **OR** ondansetron, polyethylene glycol, acetaminophen **OR** acetaminophen, hydrALAZINE, melatonin, calcium carbonate, traMADol    I/O    Intake/Output Summary (Last 24 hours) at 1/12/2023 0946  Last data filed at 1/11/2023 2137  Gross per 24 hour   Intake 220 ml   Output 500 ml   Net -280 ml         Labs:   Recent Labs     01/10/23  0630 01/11/23  0653   WBC 7.9 9.7   HGB 11.7* 11.2*   HCT 36.6* 34.9*    251         Recent Labs     01/10/23  0630 01/11/23  0653    136   K 4.3 4.4    102   CO2 23 27   BUN 14 18   CREATININE 1.1 1.1   CALCIUM 8.7 8.4*         Recent Labs     01/10/23  0630 01/11/23  0653   PROT 6.1* 5.9*   ALKPHOS 113 112   ALT 7 7   AST 13 12   BILITOT 0.5 0.3         No results for input(s): INR in the last 72 hours. No results for input(s): [place] in the last 72 hours. Chronic labs:  Lab Results   Component Value Date    CHOL 187 01/09/2023    TRIG 82 01/09/2023    HDL 40 01/09/2023    LDLCALC 131 (H) 01/09/2023    TSH 48.450 (H) 01/09/2023    INR 1.2 01/30/2018    LABA1C 5.5 01/09/2023       Radiology:  Imaging studies reviewed today.     ASSESSMENT:  Chest pain with positive stress test and elevated troponin  Chronic hypoxic respiratory failure 4L  COPD exacerbation  HLD  HTN  Elevated troponin  Acute on chronic diastolic CHF    PLAN:  Chronic hypoxic respiratory failure continue home oxygen    Chest pain/elevated troponin/abnormal stress test  Stress test-- Abnormal myocardial perfusion imaging with small size mild perfusion defect involving basal anterior wall and basal anterolateral wall suggestive of ischemia-intermediate risk  Cardiology consult    COPD exacerbation  Continue steroids transition to oral  Nebulizers    Acute on chronic diastolic CHF treated IV diuresis, resume oral Lasix strict I/O,monitor renal function  Will need oxygen set up for him prior to discharge    Hypothyroid, continue levothyroxine 125 mcg daily-elevated TSH- free T4 less than 0.10  -Suspect noncompliance with levothyroxine--repeat TSH and free T4 4 wks    Diet: ADULT DIET; Regular; Low Fat/Low Chol/High Fiber/YESSENIA  Code Status: Full Code  PT/OT Eval Status:   as needed  DVT Prophylaxis:   lovenox  Recommended disposition at discharge: Patient unable to return home due to debility, confusion and high risk for readmission, will discharge to SNF today    +++++++++++++++++++++++++++++++++++++++++++++++++  Eliazar Alcala MD   [facility].  +++++++++++++++++++++++++++++++++++++++++++++++++  NOTE: This report was transcribed using voice recognition software. Every effort was made to ensure accuracy; however, inadvertent computerized transcription errors may be present.

## 2023-01-12 NOTE — PLAN OF CARE
Problem: Discharge Planning  Goal: Discharge to home or other facility with appropriate resources  1/12/2023 1229 by Deandra Amin RN  Outcome: Adequate for Discharge  1/12/2023 0505 by Blayne Mancuso RN  Outcome: Progressing     Problem: Risk for Elopement  Goal: Patient will not exit the unit/facility without proper excort  1/12/2023 1229 by Deandra Amin RN  Outcome: Adequate for Discharge  Flowsheets (Taken 1/12/2023 0800)  Nursing Interventions for Elopement Risk:   Assist with personal care needs such as toileting, eating, dressing, as needed to reduce the risk of wandering   Communicate/escalate to charge nurse the risk of elopement   Make sure patient has all necessary personal care items   Place patient in room far away from exits and stairways   Reduce environmental triggers   Shoes and clothing collected and placed in gown attire  1/12/2023 0505 by Blayne Mancuso RN  Outcome: Progressing     Problem: Safety - Adult  Goal: Free from fall injury  1/12/2023 1229 by Deandra Amin RN  Outcome: Adequate for Discharge  1/12/2023 0505 by Blayne Mancuso RN  Outcome: Progressing     Problem: Confusion  Goal: Confusion, delirium, dementia, or psychosis is improved or at baseline  Description: INTERVENTIONS:  1. Assess for possible contributors to thought disturbance, including medications, impaired vision or hearing, underlying metabolic abnormalities, dehydration, psychiatric diagnoses, and notify attending LIP  2. [place] high risk fall precautions, as indicated  3. Provide frequent short contacts to provide reality reorientation, refocusing and direction  4. Decrease environmental stimuli, including noise as appropriate  5. Monitor and intervene to maintain adequate nutrition, hydration, elimination, sleep and activity  6. If unable to ensure safety without constant attention obtain sitter and review sitter guidelines with assigned personnel  7.  Initiate Psychosocial CNS and Spiritual Care consult, as indicated  1/12/2023 1229 by Alejandro Camp RN  Outcome: Adequate for Discharge  Flowsheets (Taken 1/12/2023 0800)  Effect of thought disturbance (confusion, delirium, dementia, or psychosis) are managed with adequate functional status:   Assess for contributors to thought disturbance, including medications, impaired vision or hearing, underlying metabolic abnormalities, dehydration, psychiatric diagnoses, notify [facility] high risk fall precautions, as indicated   Provide frequent short contacts to provide reality reorientation, refocusing and direction   Decrease environmental stimuli, including noise as appropriate   Monitor and intervene to maintain adequate nutrition, hydration, elimination, sleep and activity   If unable to ensure safety without constant attention obtain sitter and review sitter guidelines with assigned personnel   Initiate Psychosocial Clinical Nurse Specialist and [facility] consult, as indicated  1/12/2023 0505 by Eleanor Cannon RN  Outcome: Progressing     Problem: Chronic Conditions and Co-morbidities  Goal: Patient's chronic conditions and co-morbidity symptoms are monitored and maintained or improved  1/12/2023 1229 by Alejandro Camp RN  Outcome: Adequate for Discharge  1/12/2023 0505 by Eleanor Cannon RN  Outcome: Progressing     Problem: Skin/Tissue Integrity  Goal: Absence of new skin breakdown  Description: 1. Monitor for areas of redness and/or skin breakdown  2. Assess vascular access sites hourly  3. Every 4-6 hours minimum:  Change oxygen saturation probe site  4. Every 4-6 hours:  If on nasal continuous positive airway pressure, respiratory therapy assess nares and determine need for appliance change or resting period.   1/12/2023 1229 by Alejandro Camp RN  Outcome: Adequate for Discharge  1/12/2023 0505 by Eleanor Cannon RN  Outcome: Progressing     Problem: Cardiovascular - Adult  Goal: Maintains optimal cardiac output and hemodynamic stability  Outcome: Adequate for Discharge     Problem: Metabolic/Fluid and Electrolytes - Adult  Goal: Hemodynamic stability and optimal renal function maintained  Outcome: Adequate for Discharge

## 2023-01-17 NOTE — PROGRESS NOTES
Physician Progress Note      PATIENT:               Darshana Cueva  CSN #:                  [id number]  :                       1950  ADMIT DATE:       2023 11:29 AM  [address] [place] DATE:        2023 4:52 PM  RESPONDING  PROVIDER #:        Abena Mojica MD          QUERY TEXT:    Documentation reflects Possible NSTEMI by cardiology. If possible, please   document in the progress notes and discharge summary if NSTEMI was: The medical record reflects the following:  Risk Factors: HLD, HTN, acute CHF  Clinical Indicators: Dr. Cristal Farmer notes, \"Elevated hs-cTnT: 100-85 ng/L. Possible NSTEMI. Chest pain-free\". Dr. Chante Valderrama notes, \"Stress test-- Abnormal   myocardial perfusion imaging with small size mild perfusion defect involving   basal anterior wall and basal anterolateral wall suggestive of   ischemia-intermediate risk\".   Treatment: Stress test, cardiology consult, labs    Thank you,  Shadi Oneil, RN, BSN, CCDS, Clinical Documentation Improvement  Options provided:  -- NSTEMI confirmed after study  -- NSTEMI ruled out after study  -- Other - I will add my own diagnosis  -- Disagree - Not applicable / Not valid  -- Disagree - Clinically unable to determine / Unknown  -- Refer to Clinical Documentation Reviewer    PROVIDER RESPONSE TEXT:    Possible NSTEMI    Query created by: Sidra Agarwal on 2023 10:08 AM      Electronically signed by:  Abena Mojica MD 2023 1:47 PM

## 2023-02-09 PROBLEM — R77.8 ELEVATED TROPONIN: Status: RESOLVED | Noted: 2023-01-10 | Resolved: 2023-02-09
